# Patient Record
Sex: MALE | Race: WHITE | Employment: UNEMPLOYED | URBAN - METROPOLITAN AREA
[De-identification: names, ages, dates, MRNs, and addresses within clinical notes are randomized per-mention and may not be internally consistent; named-entity substitution may affect disease eponyms.]

---

## 2019-10-13 ENCOUNTER — HOSPITAL ENCOUNTER (INPATIENT)
Age: 64
LOS: 1 days | Discharge: NURSING, REHAB OR LONG-TERM CARE | DRG: 247 | End: 2019-10-14
Attending: EMERGENCY MEDICINE | Admitting: INTERNAL MEDICINE

## 2019-10-13 ENCOUNTER — APPOINTMENT (OUTPATIENT)
Dept: GENERAL RADIOLOGY | Age: 64
DRG: 247 | End: 2019-10-13

## 2019-10-13 DIAGNOSIS — I21.3 ST ELEVATION MYOCARDIAL INFARCTION (STEMI), UNSPECIFIED ARTERY (HCC): Primary | ICD-10-CM

## 2019-10-13 DIAGNOSIS — R11.2 NON-INTRACTABLE VOMITING WITH NAUSEA, UNSPECIFIED VOMITING TYPE: ICD-10-CM

## 2019-10-13 LAB
A/G RATIO: 1.4 (ref 1.1–2.2)
ALBUMIN SERPL-MCNC: 4.5 G/DL (ref 3.4–5)
ALP BLD-CCNC: 88 U/L (ref 40–129)
ALT SERPL-CCNC: 26 U/L (ref 10–40)
ANION GAP SERPL CALCULATED.3IONS-SCNC: 14 MMOL/L (ref 3–16)
APTT: 29.2 SEC (ref 26–36)
APTT: 29.7 SEC (ref 26–36)
APTT: 30.1 SEC (ref 26–36)
AST SERPL-CCNC: 29 U/L (ref 15–37)
BASOPHILS ABSOLUTE: 0.1 K/UL (ref 0–0.2)
BASOPHILS RELATIVE PERCENT: 0.5 %
BILIRUB SERPL-MCNC: <0.2 MG/DL (ref 0–1)
BUN BLDV-MCNC: 17 MG/DL (ref 7–20)
CALCIUM SERPL-MCNC: 9.5 MG/DL (ref 8.3–10.6)
CHLORIDE BLD-SCNC: 97 MMOL/L (ref 99–110)
CO2: 28 MMOL/L (ref 21–32)
CREAT SERPL-MCNC: 0.9 MG/DL (ref 0.8–1.3)
EKG ATRIAL RATE: 101 BPM
EKG ATRIAL RATE: 60 BPM
EKG DIAGNOSIS: NORMAL
EKG DIAGNOSIS: NORMAL
EKG P AXIS: 66 DEGREES
EKG P AXIS: 76 DEGREES
EKG P-R INTERVAL: 178 MS
EKG P-R INTERVAL: 190 MS
EKG Q-T INTERVAL: 342 MS
EKG Q-T INTERVAL: 408 MS
EKG QRS DURATION: 102 MS
EKG QRS DURATION: 74 MS
EKG QTC CALCULATION (BAZETT): 408 MS
EKG QTC CALCULATION (BAZETT): 443 MS
EKG R AXIS: -23 DEGREES
EKG R AXIS: 77 DEGREES
EKG T AXIS: 32 DEGREES
EKG T AXIS: 90 DEGREES
EKG VENTRICULAR RATE: 101 BPM
EKG VENTRICULAR RATE: 60 BPM
EOSINOPHILS ABSOLUTE: 0.1 K/UL (ref 0–0.6)
EOSINOPHILS RELATIVE PERCENT: 0.3 %
GFR AFRICAN AMERICAN: >60
GFR NON-AFRICAN AMERICAN: >60
GLOBULIN: 3.3 G/DL
GLUCOSE BLD-MCNC: 125 MG/DL (ref 70–99)
HCT VFR BLD CALC: 49.3 % (ref 40.5–52.5)
HEMOGLOBIN: 16.5 G/DL (ref 13.5–17.5)
LYMPHOCYTES ABSOLUTE: 1.9 K/UL (ref 1–5.1)
LYMPHOCYTES RELATIVE PERCENT: 10.5 %
MCH RBC QN AUTO: 31.3 PG (ref 26–34)
MCHC RBC AUTO-ENTMCNC: 33.5 G/DL (ref 31–36)
MCV RBC AUTO: 93.4 FL (ref 80–100)
MONOCYTES ABSOLUTE: 0.9 K/UL (ref 0–1.3)
MONOCYTES RELATIVE PERCENT: 5.1 %
NEUTROPHILS ABSOLUTE: 15.3 K/UL (ref 1.7–7.7)
NEUTROPHILS RELATIVE PERCENT: 83.6 %
PDW BLD-RTO: 13.6 % (ref 12.4–15.4)
PLATELET # BLD: 199 K/UL (ref 135–450)
PMV BLD AUTO: 8.4 FL (ref 5–10.5)
POTASSIUM SERPL-SCNC: 3.7 MMOL/L (ref 3.5–5.1)
RBC # BLD: 5.28 M/UL (ref 4.2–5.9)
SODIUM BLD-SCNC: 139 MMOL/L (ref 136–145)
TOTAL PROTEIN: 7.8 G/DL (ref 6.4–8.2)
TROPONIN: 0.03 NG/ML
WBC # BLD: 18.3 K/UL (ref 4–11)

## 2019-10-13 PROCEDURE — 2060000000 HC ICU INTERMEDIATE R&B

## 2019-10-13 PROCEDURE — C1769 GUIDE WIRE: HCPCS

## 2019-10-13 PROCEDURE — 71045 X-RAY EXAM CHEST 1 VIEW: CPT

## 2019-10-13 PROCEDURE — 96375 TX/PRO/DX INJ NEW DRUG ADDON: CPT

## 2019-10-13 PROCEDURE — 84484 ASSAY OF TROPONIN QUANT: CPT

## 2019-10-13 PROCEDURE — 85025 COMPLETE CBC W/AUTO DIFF WBC: CPT

## 2019-10-13 PROCEDURE — 99291 CRITICAL CARE FIRST HOUR: CPT | Performed by: INTERNAL MEDICINE

## 2019-10-13 PROCEDURE — 6360000004 HC RX CONTRAST MEDICATION

## 2019-10-13 PROCEDURE — 6360000002 HC RX W HCPCS

## 2019-10-13 PROCEDURE — 99285 EMERGENCY DEPT VISIT HI MDM: CPT

## 2019-10-13 PROCEDURE — 99152 MOD SED SAME PHYS/QHP 5/>YRS: CPT | Performed by: INTERNAL MEDICINE

## 2019-10-13 PROCEDURE — 93005 ELECTROCARDIOGRAM TRACING: CPT | Performed by: INTERNAL MEDICINE

## 2019-10-13 PROCEDURE — 2500000003 HC RX 250 WO HCPCS

## 2019-10-13 PROCEDURE — B2151ZZ FLUOROSCOPY OF LEFT HEART USING LOW OSMOLAR CONTRAST: ICD-10-PCS | Performed by: INTERNAL MEDICINE

## 2019-10-13 PROCEDURE — C1874 STENT, COATED/COV W/DEL SYS: HCPCS

## 2019-10-13 PROCEDURE — 2580000003 HC RX 258: Performed by: INTERNAL MEDICINE

## 2019-10-13 PROCEDURE — 4A023N7 MEASUREMENT OF CARDIAC SAMPLING AND PRESSURE, LEFT HEART, PERCUTANEOUS APPROACH: ICD-10-PCS | Performed by: INTERNAL MEDICINE

## 2019-10-13 PROCEDURE — 93458 L HRT ARTERY/VENTRICLE ANGIO: CPT | Performed by: INTERNAL MEDICINE

## 2019-10-13 PROCEDURE — 6370000000 HC RX 637 (ALT 250 FOR IP)

## 2019-10-13 PROCEDURE — 99153 MOD SED SAME PHYS/QHP EA: CPT

## 2019-10-13 PROCEDURE — C1894 INTRO/SHEATH, NON-LASER: HCPCS

## 2019-10-13 PROCEDURE — 93458 L HRT ARTERY/VENTRICLE ANGIO: CPT

## 2019-10-13 PROCEDURE — 96374 THER/PROPH/DIAG INJ IV PUSH: CPT

## 2019-10-13 PROCEDURE — 6370000000 HC RX 637 (ALT 250 FOR IP): Performed by: INTERNAL MEDICINE

## 2019-10-13 PROCEDURE — 2709999900 HC NON-CHARGEABLE SUPPLY

## 2019-10-13 PROCEDURE — 6360000002 HC RX W HCPCS: Performed by: EMERGENCY MEDICINE

## 2019-10-13 PROCEDURE — 99152 MOD SED SAME PHYS/QHP 5/>YRS: CPT

## 2019-10-13 PROCEDURE — 2720000010 HC SURG SUPPLY STERILE

## 2019-10-13 PROCEDURE — 92941 PRQ TRLML REVSC TOT OCCL AMI: CPT

## 2019-10-13 PROCEDURE — 85730 THROMBOPLASTIN TIME PARTIAL: CPT

## 2019-10-13 PROCEDURE — 92941 PRQ TRLML REVSC TOT OCCL AMI: CPT | Performed by: INTERNAL MEDICINE

## 2019-10-13 PROCEDURE — 36415 COLL VENOUS BLD VENIPUNCTURE: CPT

## 2019-10-13 PROCEDURE — 027035Z DILATION OF CORONARY ARTERY, ONE ARTERY WITH TWO DRUG-ELUTING INTRALUMINAL DEVICES, PERCUTANEOUS APPROACH: ICD-10-PCS | Performed by: INTERNAL MEDICINE

## 2019-10-13 PROCEDURE — 99232 SBSQ HOSP IP/OBS MODERATE 35: CPT | Performed by: INTERNAL MEDICINE

## 2019-10-13 PROCEDURE — 2580000003 HC RX 258: Performed by: EMERGENCY MEDICINE

## 2019-10-13 PROCEDURE — 80053 COMPREHEN METABOLIC PANEL: CPT

## 2019-10-13 PROCEDURE — C1725 CATH, TRANSLUMIN NON-LASER: HCPCS

## 2019-10-13 PROCEDURE — 93010 ELECTROCARDIOGRAM REPORT: CPT | Performed by: INTERNAL MEDICINE

## 2019-10-13 PROCEDURE — B2111ZZ FLUOROSCOPY OF MULTIPLE CORONARY ARTERIES USING LOW OSMOLAR CONTRAST: ICD-10-PCS | Performed by: INTERNAL MEDICINE

## 2019-10-13 PROCEDURE — 93005 ELECTROCARDIOGRAM TRACING: CPT | Performed by: EMERGENCY MEDICINE

## 2019-10-13 PROCEDURE — 85347 COAGULATION TIME ACTIVATED: CPT

## 2019-10-13 PROCEDURE — C1887 CATHETER, GUIDING: HCPCS

## 2019-10-13 PROCEDURE — 92973 PRQ TRLUML C MCHN ASP THRMBC: CPT

## 2019-10-13 RX ORDER — ONDANSETRON 2 MG/ML
4 INJECTION INTRAMUSCULAR; INTRAVENOUS ONCE
Status: COMPLETED | OUTPATIENT
Start: 2019-10-13 | End: 2019-10-13

## 2019-10-13 RX ORDER — 0.9 % SODIUM CHLORIDE 0.9 %
500 INTRAVENOUS SOLUTION INTRAVENOUS ONCE
Status: COMPLETED | OUTPATIENT
Start: 2019-10-13 | End: 2019-10-13

## 2019-10-13 RX ORDER — OXYCODONE HYDROCHLORIDE AND ACETAMINOPHEN 5; 325 MG/1; MG/1
1 TABLET ORAL EVERY 4 HOURS PRN
Status: DISCONTINUED | OUTPATIENT
Start: 2019-10-13 | End: 2019-10-14 | Stop reason: HOSPADM

## 2019-10-13 RX ORDER — SODIUM CHLORIDE 9 MG/ML
INJECTION, SOLUTION INTRAVENOUS CONTINUOUS
Status: DISCONTINUED | OUTPATIENT
Start: 2019-10-13 | End: 2019-10-14

## 2019-10-13 RX ORDER — ACETAMINOPHEN 325 MG/1
650 TABLET ORAL EVERY 4 HOURS PRN
Status: DISCONTINUED | OUTPATIENT
Start: 2019-10-13 | End: 2019-10-14 | Stop reason: HOSPADM

## 2019-10-13 RX ORDER — MIDAZOLAM HYDROCHLORIDE 1 MG/ML
INJECTION INTRAMUSCULAR; INTRAVENOUS
Status: COMPLETED | OUTPATIENT
Start: 2019-10-13 | End: 2019-10-13

## 2019-10-13 RX ORDER — HEPARIN SODIUM 1000 [USP'U]/ML
4000 INJECTION, SOLUTION INTRAVENOUS; SUBCUTANEOUS ONCE
Status: COMPLETED | OUTPATIENT
Start: 2019-10-13 | End: 2019-10-13

## 2019-10-13 RX ORDER — SODIUM CHLORIDE 0.9 % (FLUSH) 0.9 %
10 SYRINGE (ML) INJECTION EVERY 12 HOURS SCHEDULED
Status: DISCONTINUED | OUTPATIENT
Start: 2019-10-13 | End: 2019-10-14 | Stop reason: HOSPADM

## 2019-10-13 RX ORDER — ONDANSETRON 2 MG/ML
4 INJECTION INTRAMUSCULAR; INTRAVENOUS EVERY 6 HOURS PRN
Status: DISCONTINUED | OUTPATIENT
Start: 2019-10-13 | End: 2019-10-14 | Stop reason: HOSPADM

## 2019-10-13 RX ORDER — SODIUM CHLORIDE 9 MG/ML
INJECTION, SOLUTION INTRAVENOUS CONTINUOUS
Status: DISPENSED | OUTPATIENT
Start: 2019-10-13 | End: 2019-10-13

## 2019-10-13 RX ORDER — ONDANSETRON 2 MG/ML
INJECTION INTRAMUSCULAR; INTRAVENOUS
Status: COMPLETED | OUTPATIENT
Start: 2019-10-13 | End: 2019-10-13

## 2019-10-13 RX ORDER — MORPHINE SULFATE 2 MG/ML
2 INJECTION, SOLUTION INTRAMUSCULAR; INTRAVENOUS
Status: DISCONTINUED | OUTPATIENT
Start: 2019-10-13 | End: 2019-10-14 | Stop reason: HOSPADM

## 2019-10-13 RX ORDER — SODIUM CHLORIDE 0.9 % (FLUSH) 0.9 %
10 SYRINGE (ML) INJECTION PRN
Status: DISCONTINUED | OUTPATIENT
Start: 2019-10-13 | End: 2019-10-14 | Stop reason: HOSPADM

## 2019-10-13 RX ORDER — FENTANYL CITRATE 50 UG/ML
INJECTION, SOLUTION INTRAMUSCULAR; INTRAVENOUS
Status: COMPLETED | OUTPATIENT
Start: 2019-10-13 | End: 2019-10-13

## 2019-10-13 RX ORDER — OXYCODONE HYDROCHLORIDE AND ACETAMINOPHEN 5; 325 MG/1; MG/1
2 TABLET ORAL EVERY 4 HOURS PRN
Status: DISCONTINUED | OUTPATIENT
Start: 2019-10-13 | End: 2019-10-14 | Stop reason: HOSPADM

## 2019-10-13 RX ORDER — ASPIRIN 81 MG/1
81 TABLET ORAL DAILY
Status: DISCONTINUED | OUTPATIENT
Start: 2019-10-14 | End: 2019-10-14 | Stop reason: HOSPADM

## 2019-10-13 RX ORDER — SODIUM CHLORIDE 9 MG/ML
INJECTION, SOLUTION INTRAVENOUS
Status: DISPENSED
Start: 2019-10-13 | End: 2019-10-13

## 2019-10-13 RX ORDER — ATORVASTATIN CALCIUM 40 MG/1
40 TABLET, FILM COATED ORAL NIGHTLY
Status: DISCONTINUED | OUTPATIENT
Start: 2019-10-13 | End: 2019-10-14 | Stop reason: HOSPADM

## 2019-10-13 RX ORDER — LISINOPRIL 2.5 MG/1
2.5 TABLET ORAL DAILY
Status: DISCONTINUED | OUTPATIENT
Start: 2019-10-13 | End: 2019-10-14 | Stop reason: HOSPADM

## 2019-10-13 RX ORDER — MORPHINE SULFATE 4 MG/ML
4 INJECTION, SOLUTION INTRAMUSCULAR; INTRAVENOUS ONCE
Status: COMPLETED | OUTPATIENT
Start: 2019-10-13 | End: 2019-10-13

## 2019-10-13 RX ORDER — ATROPINE SULFATE 0.1 MG/ML
INJECTION INTRAVENOUS
Status: COMPLETED | OUTPATIENT
Start: 2019-10-13 | End: 2019-10-13

## 2019-10-13 RX ADMIN — TICAGRELOR 90 MG: 90 TABLET ORAL at 03:00

## 2019-10-13 RX ADMIN — ATORVASTATIN CALCIUM 40 MG: 40 TABLET, FILM COATED ORAL at 20:22

## 2019-10-13 RX ADMIN — SODIUM CHLORIDE: 9 INJECTION, SOLUTION INTRAVENOUS at 02:58

## 2019-10-13 RX ADMIN — HEPARIN SODIUM 4000 UNITS: 1000 INJECTION, SOLUTION INTRAVENOUS; SUBCUTANEOUS at 00:23

## 2019-10-13 RX ADMIN — SODIUM CHLORIDE 500 ML: 9 INJECTION, SOLUTION INTRAVENOUS at 00:20

## 2019-10-13 RX ADMIN — METOPROLOL TARTRATE 25 MG: 25 TABLET ORAL at 03:00

## 2019-10-13 RX ADMIN — FENTANYL CITRATE 25 MCG: 50 INJECTION, SOLUTION INTRAMUSCULAR; INTRAVENOUS at 01:46

## 2019-10-13 RX ADMIN — TICAGRELOR 90 MG: 90 TABLET ORAL at 20:22

## 2019-10-13 RX ADMIN — ONDANSETRON 4 MG: 2 INJECTION INTRAMUSCULAR; INTRAVENOUS at 01:16

## 2019-10-13 RX ADMIN — Medication 10 ML: at 09:43

## 2019-10-13 RX ADMIN — METOPROLOL TARTRATE 25 MG: 25 TABLET ORAL at 20:22

## 2019-10-13 RX ADMIN — MIDAZOLAM HYDROCHLORIDE 1 MG: 1 INJECTION INTRAMUSCULAR; INTRAVENOUS at 01:01

## 2019-10-13 RX ADMIN — SODIUM CHLORIDE: 9 INJECTION, SOLUTION INTRAVENOUS at 12:26

## 2019-10-13 RX ADMIN — ONDANSETRON 4 MG: 2 INJECTION INTRAMUSCULAR; INTRAVENOUS at 00:22

## 2019-10-13 RX ADMIN — METOPROLOL TARTRATE 25 MG: 25 TABLET ORAL at 09:42

## 2019-10-13 RX ADMIN — LISINOPRIL 2.5 MG: 2.5 TABLET ORAL at 12:26

## 2019-10-13 RX ADMIN — SODIUM CHLORIDE: 9 INJECTION, SOLUTION INTRAVENOUS at 17:31

## 2019-10-13 RX ADMIN — ATROPINE SULFATE 0.5 MG: 0.1 INJECTION INTRAVENOUS at 01:15

## 2019-10-13 RX ADMIN — FENTANYL CITRATE 25 MCG: 50 INJECTION, SOLUTION INTRAMUSCULAR; INTRAVENOUS at 00:59

## 2019-10-13 RX ADMIN — MIDAZOLAM HYDROCHLORIDE 1 MG: 1 INJECTION INTRAMUSCULAR; INTRAVENOUS at 01:00

## 2019-10-13 RX ADMIN — MORPHINE SULFATE 4 MG: 4 INJECTION INTRAVENOUS at 00:21

## 2019-10-13 RX ADMIN — TICAGRELOR 90 MG: 90 TABLET ORAL at 09:42

## 2019-10-13 RX ADMIN — MIDAZOLAM HYDROCHLORIDE 1 MG: 1 INJECTION INTRAMUSCULAR; INTRAVENOUS at 01:46

## 2019-10-13 ASSESSMENT — ENCOUNTER SYMPTOMS
VOMITING: 1
ABDOMINAL PAIN: 0
SHORTNESS OF BREATH: 1
COLOR CHANGE: 0
NAUSEA: 1
BACK PAIN: 0

## 2019-10-13 ASSESSMENT — PAIN SCALES - GENERAL
PAINLEVEL_OUTOF10: 0
PAINLEVEL_OUTOF10: 6

## 2019-10-13 ASSESSMENT — PAIN DESCRIPTION - PAIN TYPE: TYPE: ACUTE PAIN

## 2019-10-13 ASSESSMENT — PAIN DESCRIPTION - LOCATION: LOCATION: CHEST

## 2019-10-14 VITALS
DIASTOLIC BLOOD PRESSURE: 77 MMHG | BODY MASS INDEX: 23.81 KG/M2 | SYSTOLIC BLOOD PRESSURE: 128 MMHG | TEMPERATURE: 98 F | OXYGEN SATURATION: 97 % | HEIGHT: 67 IN | HEART RATE: 61 BPM | WEIGHT: 151.68 LBS | RESPIRATION RATE: 16 BRPM

## 2019-10-14 PROBLEM — I21.3 STEMI (ST ELEVATION MYOCARDIAL INFARCTION) (HCC): Status: ACTIVE | Noted: 2019-10-14

## 2019-10-14 LAB
ANION GAP SERPL CALCULATED.3IONS-SCNC: 9 MMOL/L (ref 3–16)
BUN BLDV-MCNC: 12 MG/DL (ref 7–20)
CALCIUM SERPL-MCNC: 8.6 MG/DL (ref 8.3–10.6)
CHLORIDE BLD-SCNC: 102 MMOL/L (ref 99–110)
CHOLESTEROL, TOTAL: 116 MG/DL (ref 0–199)
CO2: 23 MMOL/L (ref 21–32)
CREAT SERPL-MCNC: 0.7 MG/DL (ref 0.8–1.3)
GFR AFRICAN AMERICAN: >60
GFR NON-AFRICAN AMERICAN: >60
GLUCOSE BLD-MCNC: 108 MG/DL (ref 70–99)
HDLC SERPL-MCNC: 38 MG/DL (ref 40–60)
LDL CHOLESTEROL CALCULATED: 55 MG/DL
LV EF: 40 %
LVEF MODALITY: NORMAL
POTASSIUM REFLEX MAGNESIUM: 4.1 MMOL/L (ref 3.5–5.1)
SODIUM BLD-SCNC: 134 MMOL/L (ref 136–145)
TRIGL SERPL-MCNC: 117 MG/DL (ref 0–150)
VLDLC SERPL CALC-MCNC: 23 MG/DL

## 2019-10-14 PROCEDURE — 36415 COLL VENOUS BLD VENIPUNCTURE: CPT

## 2019-10-14 PROCEDURE — 6370000000 HC RX 637 (ALT 250 FOR IP): Performed by: INTERNAL MEDICINE

## 2019-10-14 PROCEDURE — 2580000003 HC RX 258: Performed by: INTERNAL MEDICINE

## 2019-10-14 PROCEDURE — 99233 SBSQ HOSP IP/OBS HIGH 50: CPT | Performed by: NURSE PRACTITIONER

## 2019-10-14 PROCEDURE — 93306 TTE W/DOPPLER COMPLETE: CPT

## 2019-10-14 PROCEDURE — 80061 LIPID PANEL: CPT

## 2019-10-14 PROCEDURE — 80048 BASIC METABOLIC PNL TOTAL CA: CPT

## 2019-10-14 RX ORDER — ATORVASTATIN CALCIUM 40 MG/1
40 TABLET, FILM COATED ORAL NIGHTLY
Qty: 30 TABLET | Refills: 3 | Status: SHIPPED | OUTPATIENT
Start: 2019-10-14 | End: 2019-11-11 | Stop reason: SDUPTHER

## 2019-10-14 RX ORDER — NITROGLYCERIN 0.4 MG/1
0.4 TABLET SUBLINGUAL EVERY 5 MIN PRN
Qty: 25 TABLET | Refills: 3 | Status: SHIPPED | OUTPATIENT
Start: 2019-10-14

## 2019-10-14 RX ORDER — LISINOPRIL 2.5 MG/1
2.5 TABLET ORAL DAILY
Qty: 30 TABLET | Refills: 3 | Status: SHIPPED | OUTPATIENT
Start: 2019-10-15 | End: 2019-11-11 | Stop reason: SDUPTHER

## 2019-10-14 RX ORDER — ASPIRIN 81 MG/1
81 TABLET ORAL DAILY
Qty: 30 TABLET | Refills: 11 | Status: SHIPPED | OUTPATIENT
Start: 2019-10-15

## 2019-10-14 RX ORDER — METOPROLOL SUCCINATE 25 MG/1
25 TABLET, EXTENDED RELEASE ORAL DAILY
Qty: 90 TABLET | Refills: 1 | Status: SHIPPED | OUTPATIENT
Start: 2019-10-14 | End: 2019-10-28 | Stop reason: SDUPTHER

## 2019-10-14 RX ADMIN — Medication 10 ML: at 09:46

## 2019-10-14 RX ADMIN — ACETAMINOPHEN 650 MG: 325 TABLET ORAL at 02:41

## 2019-10-14 RX ADMIN — LISINOPRIL 2.5 MG: 2.5 TABLET ORAL at 09:46

## 2019-10-14 RX ADMIN — ASPIRIN 81 MG: 81 TABLET ORAL at 09:46

## 2019-10-14 RX ADMIN — TICAGRELOR 90 MG: 90 TABLET ORAL at 09:46

## 2019-10-14 RX ADMIN — METOPROLOL TARTRATE 25 MG: 25 TABLET ORAL at 09:46

## 2019-10-14 ASSESSMENT — PAIN SCALES - GENERAL: PAINLEVEL_OUTOF10: 0

## 2019-10-15 LAB
POC ACT LR: 259 SEC
POC ACT LR: 345 SEC

## 2019-10-28 ENCOUNTER — OFFICE VISIT (OUTPATIENT)
Dept: CARDIOLOGY CLINIC | Age: 64
End: 2019-10-28

## 2019-10-28 VITALS
BODY MASS INDEX: 23.35 KG/M2 | HEIGHT: 67 IN | DIASTOLIC BLOOD PRESSURE: 60 MMHG | SYSTOLIC BLOOD PRESSURE: 84 MMHG | WEIGHT: 148.8 LBS | OXYGEN SATURATION: 98 % | HEART RATE: 76 BPM

## 2019-10-28 DIAGNOSIS — I25.5 CARDIOMYOPATHY, ISCHEMIC: Primary | ICD-10-CM

## 2019-10-28 DIAGNOSIS — R07.2 PRECORDIAL PAIN: ICD-10-CM

## 2019-10-28 DIAGNOSIS — R06.02 SOB (SHORTNESS OF BREATH): ICD-10-CM

## 2019-10-28 PROCEDURE — 99213 OFFICE O/P EST LOW 20 MIN: CPT | Performed by: INTERNAL MEDICINE

## 2019-10-28 RX ORDER — METOPROLOL SUCCINATE 25 MG/1
12.5 TABLET, EXTENDED RELEASE ORAL DAILY
Qty: 90 TABLET | Refills: 5
Start: 2019-10-28 | End: 2019-11-11 | Stop reason: SDUPTHER

## 2019-11-11 ENCOUNTER — TELEPHONE (OUTPATIENT)
Dept: CARDIOLOGY CLINIC | Age: 64
End: 2019-11-11

## 2019-11-11 RX ORDER — METOPROLOL SUCCINATE 25 MG/1
12.5 TABLET, EXTENDED RELEASE ORAL DAILY
Qty: 90 TABLET | Refills: 3 | Status: SHIPPED | OUTPATIENT
Start: 2019-11-11 | End: 2020-12-04

## 2019-11-11 RX ORDER — ATORVASTATIN CALCIUM 40 MG/1
40 TABLET, FILM COATED ORAL NIGHTLY
Qty: 90 TABLET | Refills: 3 | Status: SHIPPED | OUTPATIENT
Start: 2019-11-11 | End: 2020-08-06 | Stop reason: SDUPTHER

## 2019-11-11 RX ORDER — LISINOPRIL 2.5 MG/1
2.5 TABLET ORAL DAILY
Qty: 90 TABLET | Refills: 3 | Status: SHIPPED | OUTPATIENT
Start: 2019-11-11 | End: 2020-08-06 | Stop reason: SDUPTHER

## 2019-12-09 ENCOUNTER — TELEPHONE (OUTPATIENT)
Dept: CARDIOLOGY CLINIC | Age: 64
End: 2019-12-09

## 2020-01-13 ENCOUNTER — TELEPHONE (OUTPATIENT)
Dept: CARDIOLOGY CLINIC | Age: 65
End: 2020-01-13

## 2020-02-28 ENCOUNTER — TELEPHONE (OUTPATIENT)
Dept: CARDIOLOGY CLINIC | Age: 65
End: 2020-02-28

## 2020-02-28 NOTE — TELEPHONE ENCOUNTER
Sample requested: Brilinta    Strength: 90mg TABS    Dosage:  Take 1 tablet by mouth 2 times daily    Patient's call back number: [phone]

## 2020-04-01 ENCOUNTER — TELEPHONE (OUTPATIENT)
Dept: CARDIOLOGY CLINIC | Age: 65
End: 2020-04-01

## 2020-04-30 NOTE — PROGRESS NOTES
EXAM:    Vitals:    05/01/20 1300   BP: 102/62   Pulse: 80   Weight: 151 lb (68.5 kg)   Height: 5' 7\" (1.702 m)     Body mass index is 23.65 kg/m². Wt Readings from Last 3 Encounters:   05/01/20 151 lb (68.5 kg)   10/28/19 148 lb 12.8 oz (67.5 kg)   10/13/19 151 lb 10.8 oz (68.8 kg)     BP Readings from Last 3 Encounters:   05/01/20 102/62   10/28/19 84/60   10/14/19 128/77        PHYSICAL EXAMINATION:    Vital Signs: (As obtained by patient/caregiver or practitioner observation)    Blood pressure-  Heart rate-    Respiratory rate-    Temperature-  Pulse oximetry-     Constitutional: [x] Appears well-developed and well-nourished [x] No apparent distress      [] Abnormal-   Mental status  [x] Alert and awake  [x] Oriented to person/place/time []Able to follow commands        HENT:   [x] Normocephalic, atraumatic.   [] Abnormal   [x] Mouth/Throat: Mucous membranes are moist.       Neck: [x] No visualized mass     Pulmonary/Chest: [x] Respiratory effort normal.  [x] No visualized signs of difficulty breathing or respiratory distress        [] Abnormal-       Neurological:        [x] No Facial Asymmetry (Cranial nerve 7 motor function) (limited exam to video visit)          [x] No gaze palsy        [] Abnormal-               Psychiatric:       [x] Normal Affect [x] No Hallucinations        [] Abnormal-     Other pertinent observable physical exam findings-                    Labs   CBC:   Lab Results   Component Value Date    WBC 18.3 10/13/2019    RBC 5.28 10/13/2019    HGB 16.5 10/13/2019    HCT 49.3 10/13/2019    MCV 93.4 10/13/2019    RDW 13.6 10/13/2019     10/13/2019     CMP:  Lab Results   Component Value Date     10/14/2019    K 4.1 10/14/2019     10/14/2019    CO2 23 10/14/2019    BUN 12 10/14/2019    CREATININE 0.7 10/14/2019    GFRAA >60 10/14/2019    GFRAA >60 05/14/2013    AGRATIO 1.4 10/13/2019    LABGLOM >60 10/14/2019    GLUCOSE 108 10/14/2019    PROT 7.8 10/13/2019    CALCIUM 8.6 10/14/2019    BILITOT <0.2 10/13/2019    ALKPHOS 88 10/13/2019    AST 29 10/13/2019    ALT 26 10/13/2019     PT/INR:  No results found for: PTINR  HgBA1c:No results found for: LABA1C  Lab Results   Component Value Date    TROPONINI 0.03 (H) 10/13/2019         Cardiac Data     Last EKG: 10/2019, nsr, infer infarct    Echo: 10/13/19   Left ventricular systolic function is mildly reduced with a visually   estimated ejection fraction of 40 %. The left ventricle is normal in size with normal wall thickness. Hypokinesis of the mid and basal inferoseptal and anteroseptal wall   segments. Grade I diastolic dysfunction with normal LV pressure. Mild tricuspid regurgitation. Systolic pulmonary artery pressure (SPAP) is normal and estimated at 18 mmHg   (right atrial pressure 3 mmHg). Stress Test:    Cath: 10/13/19  Cardiac Cath with PCI, LVG:  Anatomy:   LM-Ostial 40%, mid Anuerysmal   LAD-Patent  Cx-Patent  OM- Patent  RCA-Dominant, Proximal 100% thrombotic occluded  RPDA- No disease  LVEF- 60%  LVG- Inferobasal akinesis  LVEDP- 20     Intervention  ~Successful PCI to proximal RCA with 3.5x18 GIUSEPPE post dilated with 3.75x12 NC balloon to 16atm and distal RCA with 3.5x28 GIUSEPPE to 12atm. MULUGETA 0 to MULUGETA 3, 100% down to 0%. Excellent Result. Contrast: 190ml  Flouro Time: 17.3m  Access: R radial     Impression  ~Coronary Angiography w/ STEMI of RCA, 100% thrombotic occlusion. Moderate ostial LM stenosis  ~LVG with LVEF of 60% and inferobasal regional wall motion abnormalities  ~Successful complex angioplasty and stenting of RCA           Recommendation  ~Aggressive medical treatment and risk factor modification  ~1. Cont aggrastat for 6 hours. Post cath IVF. Bedrest.  2. Recommend beta blocker, high potency statin, aspirin and Brilinta for 12 months  3. Referral to cardiac rehab placed  4. Patient has been advised on the importance of regular exercise of at least 20-30 minutes daily.    5. Patient counseled about and offered assistance for smoking cessation   4. Follow up in 1-2 weeks with cardiology            Studies:       I have reviewed labs and imaging/xray/diagnostic testing in this note. Assessment        Patient Active Problem List   Diagnosis    Chest pain    Chronic back pain    Current smoker    ST elevation myocardial infarction involving right coronary artery ([facility]    STEMI (ST elevation myocardial infarction) ([facility]    Cardiomyopathy, ischemic    SOB (shortness of breath)                Plan   1. Echo to evaluate cardiomyopathy. The  will call you to schedule  2. Stress myoview to evaluate chest pain, abnl ekg, h/o cad  3. Liver and lipids   4. Stop brilinta  5. Start plavix 300 mg on day one and then 75 mg daily afterwards  6. Follow up with NP in 2 months. Virtual visit ok      Scribe's attestation: This note was scribed in the presence of Dr. Tony Green by Rakesh Delgado RN        Jeanette James is a 63 y.o. male being evaluated by a Virtual Visit (video visit) encounter to address concerns as mentioned above. A caregiver was present when appropriate. Due to this being a TeleHealth encounter (During [address] emergency), evaluation of the following organ systems was limited: Vitals/Constitutional/EENT/Resp/CV/GI//MS/Neuro/Skin/Heme-Lymph-Imm. Pursuant to the emergency declaration under the [address] [place] [address], [address] [place] [address] authority and the [employer] and [employer] Act, this Virtual Visit was conducted with patient's (and/or legal guardian's) consent, to reduce the patient's risk of exposure to COVID-19 and provide necessary medical care. The patient (and/or legal guardian) has also been advised to contact this office for worsening conditions or problems, and seek emergency medical treatment and/or call 911 if deemed necessary.      Services were provided through a video synchronous discussion virtually to

## 2020-05-01 ENCOUNTER — TELEPHONE (OUTPATIENT)
Dept: CARDIOLOGY CLINIC | Age: 65
End: 2020-05-01

## 2020-05-01 ENCOUNTER — VIRTUAL VISIT (OUTPATIENT)
Dept: CARDIOLOGY CLINIC | Age: 65
End: 2020-05-01

## 2020-05-01 VITALS
SYSTOLIC BLOOD PRESSURE: 102 MMHG | WEIGHT: 151 LBS | HEIGHT: 67 IN | HEART RATE: 80 BPM | BODY MASS INDEX: 23.7 KG/M2 | DIASTOLIC BLOOD PRESSURE: 62 MMHG

## 2020-05-01 PROCEDURE — 99214 OFFICE O/P EST MOD 30 MIN: CPT | Performed by: INTERNAL MEDICINE

## 2020-05-01 RX ORDER — CLOPIDOGREL BISULFATE 75 MG/1
75 TABLET ORAL DAILY
Qty: 34 TABLET | Refills: 5 | Status: SHIPPED | OUTPATIENT
Start: 2020-05-01 | End: 2020-08-06 | Stop reason: SDUPTHER

## 2020-05-01 RX ORDER — CLOPIDOGREL BISULFATE 75 MG/1
TABLET ORAL
Qty: 34 TABLET | Refills: 5 | Status: CANCELLED | OUTPATIENT
Start: 2020-05-01

## 2020-05-20 ENCOUNTER — TELEPHONE (OUTPATIENT)
Dept: CARDIOLOGY CLINIC | Age: 65
End: 2020-05-20

## 2020-07-01 PROBLEM — I25.10 CORONARY ARTERY DISEASE INVOLVING NATIVE CORONARY ARTERY OF NATIVE HEART WITHOUT ANGINA PECTORIS: Status: ACTIVE | Noted: 2020-07-01

## 2020-08-06 ENCOUNTER — TELEPHONE (OUTPATIENT)
Dept: CARDIOLOGY CLINIC | Age: 65
End: 2020-08-06

## 2020-08-06 RX ORDER — ATORVASTATIN CALCIUM 40 MG/1
40 TABLET, FILM COATED ORAL NIGHTLY
Qty: 90 TABLET | Refills: 3 | Status: SHIPPED | OUTPATIENT
Start: 2020-08-06 | End: 2021-08-13

## 2020-08-06 RX ORDER — CLOPIDOGREL BISULFATE 75 MG/1
75 TABLET ORAL DAILY
Qty: 90 TABLET | Refills: 3 | Status: SHIPPED | OUTPATIENT
Start: 2020-08-06 | End: 2020-08-06

## 2020-08-06 RX ORDER — LISINOPRIL 2.5 MG/1
2.5 TABLET ORAL DAILY
Qty: 90 TABLET | Refills: 3 | Status: SHIPPED | OUTPATIENT
Start: 2020-08-06 | End: 2021-07-29

## 2020-08-06 RX ORDER — CLOPIDOGREL BISULFATE 75 MG/1
75 TABLET ORAL DAILY
COMMUNITY
End: 2021-07-29

## 2020-08-06 NOTE — TELEPHONE ENCOUNTER
LOV 05/01/2020 w/ PRATEEK (vv)    Recommendation  ~Aggressive medical treatment and risk factor modification  ~1. Cont aggrastat for 6 hours. Post cath IVF. Bedrest.  2. Recommend beta blocker, high potency statin, aspirin and Brilinta for 12 months  3. Referral to cardiac rehab placed  4. Patient has been advised on the importance of regular exercise of at least 20-30 minutes daily. 5. Patient counseled about and offered assistance for smoking cessation   4.  Follow up in 1-2 weeks with cardiology

## 2020-08-06 NOTE — TELEPHONE ENCOUNTER
Pt has refilled his last aval supple on lisinopril (PRINIVIL;ZESTRIL) 2.5 MG tablet  clopidogrel (PLAVIX) 75 MG tablet   atorvastatin (LIPITOR) 40 MG tablet    Lov was June. Pt giving heads up that after this 90 day supply pharmacy will need new orders sent in.

## 2020-12-04 RX ORDER — METOPROLOL SUCCINATE 25 MG/1
TABLET, EXTENDED RELEASE ORAL
Qty: 45 TABLET | Refills: 1 | Status: SHIPPED | OUTPATIENT
Start: 2020-12-04 | End: 2021-05-26

## 2020-12-16 NOTE — PROGRESS NOTES
[facility]   CARDIAC EVALUATION NOTE  [phone]      PCP:  No primary care provider on file. Reason for Consultation/Chief Complaint: follow up for CAD     Subjective   History of Present Illness:  Linette Tavares is a 63 y.o. patient with a history of CAD with STEMI who presents for hospital follow up. His echo from 10/13/19 showed his EF was 40% with hypokinesis of the mid and basal inferoseptal and anteroseptal wall segments. Grade I DD. His cardiac cath from 10/13/19 resulted in a successful PCI to proximal RCA with GIUSEPPE and distal RCA with GIUSEPPE. He reports he has stopped smoking. He denies recurrent CP, palpitations, dizziness or syncope. He reports he has SOB with brilinta and late at night. He reports he lost his insurance after changing jobs. At his OV on 05/01/20, his brilinta was stopped and plavix was started. Linette Tavares is a 63 y.o. male evaluated via virtual visit (video) on 12/17/2020. Consent:  He and/or health care decision maker is aware that that he may receive a bill for this telephone service, depending on his insurance coverage, and has provided verbal consent to proceed: Yes      Documentation:  I communicated with the patient and/or health care decision maker about medications and symptoms. Details of this discussion including any medical advice provided: see below      I affirm this is a Patient Initiated Episode with an Established Patient who has not had a related appointment within my department in the past 7 days or scheduled within the next 24 hours.     Total Time: minutes: 5-10 minutes    Note: not billable if this call serves to triage the patient into an appointment for the relevant concern    12/17/2020    TELEHEALTH EVALUATION -- Audio/Visual (During [facility]-01 public health emergency) Lucio Sawant Monday (:  1955) has requested an audio/video evaluation for the following concern(s): CM and CAD. Today he reports he was feeling well until 1 month ago. He reports he stared having dizziness which has caused him to fall. He reports he has balance issues frequently. He reports he had CP and palpitations with the episodes. He reports his HR was slowing down. He denies SOB, BLE edema or syncope. He has 25% nerve damage from a previous back surgery. Past Medical History:   has a past medical history of Back pain. Surgical History:   has a past surgical history that includes back surgery. Social History:   reports that he quit smoking about a year ago. His smoking use included cigarettes. He smoked 2.00 packs per day. He has never used smokeless tobacco. He reports that he does not drink alcohol or use drugs. Family History:  family history includes COPD in his mother; Cancer in his father. Home Medications:  Were reviewed and are listed in nursing record and/or below  Prior to Admission medications    Medication Sig Start Date End Date Taking? Authorizing Provider   metoprolol succinate (TOPROL XL) 25 MG extended release tablet Take 1/2 (one-half) tablet by mouth once daily 20  Yes Junior Demetrius MD   atorvastatin (LIPITOR) 40 MG tablet Take 1 tablet by mouth nightly 20  Yes Junior Demetrius MD   lisinopril (PRINIVIL;ZESTRIL) 2.5 MG tablet Take 1 tablet by mouth daily 20  Yes Junior Demetrius MD   clopidogrel (PLAVIX) 75 MG tablet Take 75 mg by mouth daily   Yes Historical Provider, MD   aspirin 81 MG EC tablet Take 1 tablet by mouth daily 10/15/19  Yes STEFANO Julien CNP   nitroGLYCERIN (NITROSTAT) 0.4 MG SL tablet Place 1 tablet under the tongue every 5 minutes as needed for Chest pain up to max of 3 total doses.  If no relief after 1 dose, call 911. 10/14/19  Yes STEFANO Julien CNP acetaminophen (TYLENOL) 325 MG tablet Take 650 mg by mouth every 6 hours as needed. Yes Historical Provider, MD          Allergies:  Demerol hcl [meperidine], Codeine, and Percocet [oxycodone-acetaminophen]     Review of Systems:   A 14 point review of symptoms completed. Pertinent positives identified in the HPI, all other review of symptoms negative as below. Objective   PHYSICAL EXAM:    There were no vitals filed for this visit. There is no height or weight on file to calculate BMI. Wt Readings from Last 3 Encounters:   05/01/20 151 lb (68.5 kg)   10/28/19 148 lb 12.8 oz (67.5 kg)   10/13/19 151 lb 10.8 oz (68.8 kg)     BP Readings from Last 3 Encounters:   05/01/20 102/62   10/28/19 84/60   10/14/19 128/77      /71, pulse 86, weight 155. Labs   CBC:   Lab Results   Component Value Date    WBC 18.3 10/13/2019    RBC 5.28 10/13/2019    HGB 16.5 10/13/2019    HCT 49.3 10/13/2019    MCV 93.4 10/13/2019    RDW 13.6 10/13/2019     10/13/2019     CMP:  Lab Results   Component Value Date     10/14/2019    K 4.1 10/14/2019     10/14/2019    CO2 23 10/14/2019    BUN 12 10/14/2019    CREATININE 0.7 10/14/2019    GFRAA >60 10/14/2019    GFRAA >60 05/14/2013    AGRATIO 1.4 10/13/2019    LABGLOM >60 10/14/2019    GLUCOSE 108 10/14/2019    PROT 7.8 10/13/2019    CALCIUM 8.6 10/14/2019    BILITOT <0.2 10/13/2019    ALKPHOS 88 10/13/2019    AST 29 10/13/2019    ALT 26 10/13/2019     PT/INR:  No results found for: PTINR  HgBA1c:No results found for: LABA1C  Lab Results   Component Value Date    TROPONINI 0.03 (H) 10/13/2019         Cardiac Data     Last EKG: 10/2019, nsr, infer infarct    Echo: 10/13/19   Left ventricular systolic function is mildly reduced with a visually   estimated ejection fraction of 40 %. The left ventricle is normal in size with normal wall thickness. Hypokinesis of the mid and basal inferoseptal and anteroseptal wall   segments. Grade I diastolic dysfunction with normal LV pressure. Mild tricuspid regurgitation. Systolic pulmonary artery pressure (SPAP) is normal and estimated at 18 mmHg   (right atrial pressure 3 mmHg). Stress Test:    Cath: 10/13/19  Cardiac Cath with PCI, LVG:  Anatomy:   LM-Ostial 40%, mid Anuerysmal   LAD-Patent  Cx-Patent  OM- Patent  RCA-Dominant, Proximal 100% thrombotic occluded  RPDA- No disease  LVEF- 60%  LVG- Inferobasal akinesis  LVEDP- 20     Intervention  ~Successful PCI to proximal RCA with 3.5x18 GIUSEPPE post dilated with 3.75x12 NC balloon to 16atm and distal RCA with 3.5x28 GIUSEPPE to 12atm. MULUGETA 0 to MULUGETA 3, 100% down to 0%. Excellent Result. Contrast: 190ml  Flouro Time: 17.3m  Access: R radial     Impression  ~Coronary Angiography w/ STEMI of RCA, 100% thrombotic occlusion. Moderate ostial LM stenosis  ~LVG with LVEF of 60% and inferobasal regional wall motion abnormalities  ~Successful complex angioplasty and stenting of RCA           Recommendation  ~Aggressive medical treatment and risk factor modification  ~1. Cont aggrastat for 6 hours. Post cath IVF. Bedrest.  2. Recommend beta blocker, high potency statin, aspirin and Brilinta for 12 months  3. Referral to cardiac rehab placed  4. Patient has been advised on the importance of regular exercise of at least 20-30 minutes daily. 5. Patient counseled about and offered assistance for smoking cessation   4. Follow up in 1-2 weeks with cardiology            Studies:       I have reviewed labs and imaging/xray/diagnostic testing in this note. Assessment      1. Coronary artery disease involving native coronary artery of native heart without angina pectoris    2. ST elevation myocardial infarction involving right coronary artery (HCC)    3. Cardiomyopathy, ischemic    4. Precordial pain    5. Palpitations                Plan   1.  Mohsen Merl for chest pain   2. Echo for cardiomyopathy  3. 2 week cardiac monitor for palpitations 4. Liver and lipids  5. Follow up in 2 months with NP. Can be virtual      Scribe's attestation: This note was scribed in the presence of Dr. Bishop Mccoy by Cris Candelaria RN         Mi Palacios is a 63 y.o. male being evaluated by a Virtual Visit (video visit) encounter to address concerns as mentioned above. A caregiver was present when appropriate. Due to this being a TeleHealth encounter (During [facility] public health emergency), evaluation of the following organ systems was limited: Vitals/Constitutional/EENT/Resp/CV/GI//MS/Neuro/Skin/Heme-Lymph-Imm. Pursuant to the emergency declaration under the [address] [place] [address], [address] [place] [address] authority and the [employer] and [employer] Act, this Virtual Visit was conducted with patient's (and/or legal guardian's) consent, to reduce the patient's risk of exposure to COVID-19 and provide necessary medical care. The patient (and/or legal guardian) has also been advised to contact this office for worsening conditions or problems, and seek emergency medical treatment and/or call 911 if deemed necessary. Services were provided through a telephone synchronous discussion virtually to substitute for in-person clinic visit. Patient and provider were located at their individual homes. --Bishop Nadine MD on 12/17/2020 at 11:24 AM    An electronic signature was used to authenticate this note. Thank you for allowing us to participate in the care of Lucio Austin. Please call me with any questions [phone]. Bishop Nadine MD, [facility]   Interventional Cardiologist  [facility] [place]  [phone] [facility]  [phone] [address] [place] [address]  12/17/2020 11:24 AM    I will address the patient's cardiac risk factors and adjusted pharmacologic treatment as needed. In addition, I have reinforced the need for patient directed risk factor modification. Tobacco use was discussed with the patient and educated on the negative effects and was asked not to use. All questions and concerns were addressed to the patient/family. Alternatives to my treatment were discussed. I, Dr Zahida Good, personally performed the services described in this documentation, as scribed by the above signed scribe in my presence. It is both accurate and complete to my knowledge. I agree with the details independently gathered by the clinical support staff and the scribed note accurately describes my personal service to the patient.

## 2020-12-17 ENCOUNTER — VIRTUAL VISIT (OUTPATIENT)
Dept: CARDIOLOGY CLINIC | Age: 65
End: 2020-12-17

## 2020-12-17 ENCOUNTER — TELEPHONE (OUTPATIENT)
Dept: CARDIOLOGY CLINIC | Age: 65
End: 2020-12-17

## 2020-12-17 PROCEDURE — 99212 OFFICE O/P EST SF 10 MIN: CPT | Performed by: INTERNAL MEDICINE

## 2020-12-17 NOTE — TELEPHONE ENCOUNTER
Attempted to call pt. No answer no VM. He will need to call [address] [place] [address] to schedule stress and echo tests. He will need a follow up visit with NP in 2 months.  [phone] [address]ia for virtual.

## 2020-12-17 NOTE — TELEPHONE ENCOUNTER
Monitor placed by ordered by [employer] of monitor 14 days  Monitor ordered by DR Jose Manriquez  Serial number mailed  Kit ID   Activation successful prior to pt leaving office? NA mailed    Attempted to call pt to give him number for [employer] to make arrangements for self pay.  [employer] number   [phone]

## 2020-12-17 NOTE — PATIENT INSTRUCTIONS
1. Jill Chino for chest pain   2. Echo for low heart function  3. 2 week cardiac monitor for palpitations  4. Liver and lipids  5. Follow up in 2 months with NP. Can be virtual      Your provider has ordered testing for further evaluation. An order/prescription has been included in your paper work. ? To schedule outpatient testing, contact Central Scheduling by calling [employer] ([phone]).

## 2021-05-26 RX ORDER — METOPROLOL SUCCINATE 25 MG/1
TABLET, EXTENDED RELEASE ORAL
Qty: 45 TABLET | Refills: 1 | Status: SHIPPED | OUTPATIENT
Start: 2021-05-26 | End: 2021-11-24

## 2021-07-28 ENCOUNTER — APPOINTMENT (OUTPATIENT)
Dept: CT IMAGING | Age: 66
End: 2021-07-28

## 2021-07-28 ENCOUNTER — HOSPITAL ENCOUNTER (EMERGENCY)
Age: 66
Discharge: HOME | End: 2021-07-28

## 2021-07-28 VITALS
TEMPERATURE: 97.9 F | HEART RATE: 75 BPM | DIASTOLIC BLOOD PRESSURE: 88 MMHG | SYSTOLIC BLOOD PRESSURE: 121 MMHG | HEIGHT: 67 IN | RESPIRATION RATE: 16 BRPM | OXYGEN SATURATION: 97 % | WEIGHT: 146 LBS | BODY MASS INDEX: 22.91 KG/M2

## 2021-07-28 DIAGNOSIS — R10.9 ABDOMINAL DISCOMFORT: ICD-10-CM

## 2021-07-28 DIAGNOSIS — K59.00 CONSTIPATION, UNSPECIFIED CONSTIPATION TYPE: Primary | ICD-10-CM

## 2021-07-28 LAB
A/G RATIO: 1.5 (ref 1.1–2.2)
ALBUMIN SERPL-MCNC: 4.5 G/DL (ref 3.4–5)
ALP BLD-CCNC: 100 U/L (ref 40–129)
ALT SERPL-CCNC: 23 U/L (ref 10–40)
ANION GAP SERPL CALCULATED.3IONS-SCNC: 13 MMOL/L (ref 3–16)
AST SERPL-CCNC: 24 U/L (ref 15–37)
BASOPHILS ABSOLUTE: 0 K/UL (ref 0–0.2)
BASOPHILS RELATIVE PERCENT: 0.2 %
BILIRUB SERPL-MCNC: 0.7 MG/DL (ref 0–1)
BILIRUBIN URINE: NEGATIVE
BLOOD, URINE: NEGATIVE
BUN BLDV-MCNC: 11 MG/DL (ref 7–20)
CALCIUM SERPL-MCNC: 9.5 MG/DL (ref 8.3–10.6)
CHLORIDE BLD-SCNC: 102 MMOL/L (ref 99–110)
CLARITY: CLEAR
CO2: 22 MMOL/L (ref 21–32)
COLOR: YELLOW
CREAT SERPL-MCNC: 0.8 MG/DL (ref 0.8–1.3)
EOSINOPHILS ABSOLUTE: 0.1 K/UL (ref 0–0.6)
EOSINOPHILS RELATIVE PERCENT: 0.9 %
GFR AFRICAN AMERICAN: >60
GFR NON-AFRICAN AMERICAN: >60
GLOBULIN: 3 G/DL
GLUCOSE BLD-MCNC: 106 MG/DL (ref 70–99)
GLUCOSE URINE: NEGATIVE MG/DL
HCT VFR BLD CALC: 47.6 % (ref 40.5–52.5)
HEMOGLOBIN: 16.3 G/DL (ref 13.5–17.5)
KETONES, URINE: NEGATIVE MG/DL
LEUKOCYTE ESTERASE, URINE: NEGATIVE
LIPASE: 58 U/L (ref 13–60)
LYMPHOCYTES ABSOLUTE: 2 K/UL (ref 1–5.1)
LYMPHOCYTES RELATIVE PERCENT: 23.6 %
MCH RBC QN AUTO: 31.8 PG (ref 26–34)
MCHC RBC AUTO-ENTMCNC: 34.3 G/DL (ref 31–36)
MCV RBC AUTO: 92.8 FL (ref 80–100)
MICROSCOPIC EXAMINATION: NORMAL
MONOCYTES ABSOLUTE: 0.8 K/UL (ref 0–1.3)
MONOCYTES RELATIVE PERCENT: 9.6 %
NEUTROPHILS ABSOLUTE: 5.5 K/UL (ref 1.7–7.7)
NEUTROPHILS RELATIVE PERCENT: 65.7 %
NITRITE, URINE: NEGATIVE
PDW BLD-RTO: 14.2 % (ref 12.4–15.4)
PH UA: 5.5 (ref 5–8)
PLATELET # BLD: 207 K/UL (ref 135–450)
PMV BLD AUTO: 8 FL (ref 5–10.5)
POTASSIUM SERPL-SCNC: 4.2 MMOL/L (ref 3.5–5.1)
PROTEIN UA: NEGATIVE MG/DL
RBC # BLD: 5.13 M/UL (ref 4.2–5.9)
SODIUM BLD-SCNC: 137 MMOL/L (ref 136–145)
SPECIFIC GRAVITY UA: 1.01 (ref 1–1.03)
TOTAL PROTEIN: 7.5 G/DL (ref 6.4–8.2)
URINE TYPE: NORMAL
UROBILINOGEN, URINE: 0.2 E.U./DL
WBC # BLD: 8.4 K/UL (ref 4–11)

## 2021-07-28 PROCEDURE — 85025 COMPLETE CBC W/AUTO DIFF WBC: CPT

## 2021-07-28 PROCEDURE — 80053 COMPREHEN METABOLIC PANEL: CPT

## 2021-07-28 PROCEDURE — 81003 URINALYSIS AUTO W/O SCOPE: CPT

## 2021-07-28 PROCEDURE — 2580000003 HC RX 258: Performed by: PHYSICIAN ASSISTANT

## 2021-07-28 PROCEDURE — 74177 CT ABD & PELVIS W/CONTRAST: CPT

## 2021-07-28 PROCEDURE — 6360000004 HC RX CONTRAST MEDICATION: Performed by: PHYSICIAN ASSISTANT

## 2021-07-28 PROCEDURE — 83690 ASSAY OF LIPASE: CPT

## 2021-07-28 PROCEDURE — 99283 EMERGENCY DEPT VISIT LOW MDM: CPT

## 2021-07-28 RX ORDER — POLYETHYLENE GLYCOL 3350 17 G/17G
17 POWDER, FOR SOLUTION ORAL DAILY
Qty: 510 G | Refills: 0 | Status: SHIPPED | OUTPATIENT
Start: 2021-07-28 | End: 2021-08-27

## 2021-07-28 RX ORDER — 0.9 % SODIUM CHLORIDE 0.9 %
1000 INTRAVENOUS SOLUTION INTRAVENOUS ONCE
Status: COMPLETED | OUTPATIENT
Start: 2021-07-28 | End: 2021-07-28

## 2021-07-28 RX ADMIN — SODIUM CHLORIDE 1000 ML: 9 INJECTION, SOLUTION INTRAVENOUS at 18:12

## 2021-07-28 RX ADMIN — IOPAMIDOL 75 ML: 755 INJECTION, SOLUTION INTRAVENOUS at 17:51

## 2021-07-28 ASSESSMENT — ENCOUNTER SYMPTOMS
ABDOMINAL PAIN: 1
CONSTIPATION: 1
COLOR CHANGE: 0
NAUSEA: 1
EYES NEGATIVE: 1
SHORTNESS OF BREATH: 0
VOMITING: 0
DIARRHEA: 0
BACK PAIN: 0

## 2021-07-28 NOTE — ED PROVIDER NOTES
negative. Except as noted above in the ROS, all other systems were reviewed and negative. PAST MEDICAL HISTORY:     Past Medical History:   Diagnosis Date    Back pain     Heart attack ([facility] [address]          SURGICAL HISTORY:      Past Surgical History:   Procedure Laterality Date    BACK SURGERY           CURRENT MEDICATIONS:       Discharge Medication List as of 7/28/2021  8:10 PM      CONTINUE these medications which have NOT CHANGED    Details   metoprolol succinate (TOPROL XL) 25 MG extended release tablet Take 1/2 (one-half) tablet by mouth once daily, Disp-45 tablet, R-1Normal      atorvastatin (LIPITOR) 40 MG tablet Take 1 tablet by mouth nightly, Disp-90 tablet,R-3Normal      lisinopril (PRINIVIL;ZESTRIL) 2.5 MG tablet Take 1 tablet by mouth daily, Disp-90 tablet,R-3Normal      clopidogrel (PLAVIX) 75 MG tablet Take 75 mg by mouth dailyHistorical Med      aspirin 81 MG EC tablet Take 1 tablet by mouth daily, Disp-30 tablet, R-11Normal      nitroGLYCERIN (NITROSTAT) 0.4 MG SL tablet Place 1 tablet under the tongue every 5 minutes as needed for Chest pain up to max of 3 total doses. If no relief after 1 dose, call 911., Disp-25 tablet, R-3Normal      acetaminophen (TYLENOL) 325 MG tablet Take 650 mg by mouth every 6 hours as needed. Historical Med               ALLERGIES:    Demerol hcl [meperidine], Codeine, and Percocet [oxycodone-acetaminophen]    FAMILY HISTORY:       Family History   Problem Relation Age of Onset    COPD Mother     Cancer Father           SOCIAL HISTORY:       Social History     Socioeconomic History    Marital status:      Spouse name: None    Number of children: None    Years of education: None    Highest education level: None   Occupational History    None   Tobacco Use    Smoking status: Current Every Day Smoker     Packs/day: 1.00     Types: Cigarettes    Smokeless tobacco: Never Used   Vaping Use    Vaping Use: Never used   Substance and Sexual Activity    Alcohol use: No    Drug use: No    Sexual activity: None   Other Topics Concern    None   Social History Narrative    None     Social Determinants of Health     Financial Resource Strain:     Difficulty of Paying Living Expenses:    Food Insecurity:     Worried About Running Out of Food in the Last Year:     [address] in the Last Year:    Transportation Needs:     Lack of Transportation (Medical):  Lack of Transportation (Non-Medical):    Physical Activity:     Days of Exercise per Week:     Minutes of Exercise per Session:    Stress:     Feeling of Stress :    Social Connections:     Frequency of Communication with Friends and Family:     Frequency of Social Gatherings with Friends and Family:     Attends [employer] Services:     Active Member of Clubs or Organizations:     Attends Club or Organization Meetings:     Marital Status:    Intimate Partner Violence:     Fear of Current or Ex-Partner:     Emotionally Abused:     Physically Abused:     Sexually Abused:        SCREENINGS:             PHYSICAL EXAM:       ED Triage Vitals [07/28/21 1433]   BP Temp Temp Source Pulse Resp SpO2 Height Weight   138/76 97.9 °F (36.6 °C) Oral (!) 7 16 95 % 5' 7\" (1.702 m) 146 lb (66.2 kg)       Physical Exam    CONSTITUTIONAL: Awake and alert. Cooperative. Well-developed. Well-nourished. Non-toxic. No acute distress. HENT: Normocephalic. Atraumatic. External ears normal, without discharge. No nasal discharge. Oropharynx clear. Mucous membranes moist.  EYES: Conjunctiva non-injected. No scleral icterus. PERRL. EOM's grossly intact. NECK: Supple. Normal ROM. CARDIOVASCULAR: RRR. No Murmer. Intact distal pulses. PULMONARY/CHEST WALL: Effort normal. No tachypnea. Lungs clear to ausculation. ABDOMEN: Normal BS. Soft. Nondistended. No tenderness to palpate. No guarding. /ANORECTAL: Not assessed  MUSKULOSKELETAL: Normal ROM. No acute deformities. No edema. No tenderness to palpate.   SKIN: Warm and dry. No rash. NEUROLOGICAL: Alert and oriented x 3. GCS 15. CN II-XII grossly intact. Strength is 5/5 in all extremities and sensation is intact. Normal gait.    PSYCHIATRIC: Normal affect        DIAGNOSTICRESULTS:     LABS:    Results for orders placed or performed during the hospital encounter of 07/28/21   CBC auto differential   Result Value Ref Range    WBC 8.4 4.0 - 11.0 K/uL    RBC 5.13 4.20 - 5.90 M/uL    Hemoglobin 16.3 13.5 - 17.5 g/dL    Hematocrit 47.6 40.5 - 52.5 %    MCV 92.8 80.0 - 100.0 fL    MCH 31.8 26.0 - 34.0 pg    MCHC 34.3 31.0 - 36.0 g/dL    RDW 14.2 12.4 - 15.4 %    Platelets 744 731 - 780 K/uL    MPV 8.0 5.0 - 10.5 fL    Neutrophils % 65.7 %    Lymphocytes % 23.6 %    Monocytes % 9.6 %    Eosinophils % 0.9 %    Basophils % 0.2 %    Neutrophils Absolute 5.5 1.7 - 7.7 K/uL    Lymphocytes Absolute 2.0 1.0 - 5.1 K/uL    Monocytes Absolute 0.8 0.0 - 1.3 K/uL    Eosinophils Absolute 0.1 0.0 - 0.6 K/uL    Basophils Absolute 0.0 0.0 - 0.2 K/uL   Comprehensive metabolic panel   Result Value Ref Range    Sodium 137 136 - 145 mmol/L    Potassium 4.2 3.5 - 5.1 mmol/L    Chloride 102 99 - 110 mmol/L    CO2 22 21 - 32 mmol/L    Anion Gap 13 3 - 16    Glucose 106 (H) 70 - 99 mg/dL    BUN 11 7 - 20 mg/dL    CREATININE 0.8 0.8 - 1.3 mg/dL    GFR Non-African American >60 >60    GFR African American >60 >60    Calcium 9.5 8.3 - 10.6 mg/dL    Total Protein 7.5 6.4 - 8.2 g/dL    Albumin 4.5 3.4 - 5.0 g/dL    Albumin/Globulin Ratio 1.5 1.1 - 2.2    Total Bilirubin 0.7 0.0 - 1.0 mg/dL    Alkaline Phosphatase 100 40 - 129 U/L    ALT 23 10 - 40 U/L    AST 24 15 - 37 U/L    Globulin 3.0 g/dL   Lipase   Result Value Ref Range    Lipase 58.0 13.0 - 60.0 U/L   Urinalysis, reflex to microscopic   Result Value Ref Range    Color, UA Yellow Straw/Yellow    Clarity, UA Clear Clear    Glucose, Ur Negative Negative mg/dL    Bilirubin Urine Negative Negative    Ketones, Urine Negative Negative mg/dL    Specific [place], UA 1.010 1.005 - 1.030    Blood, Urine Negative Negative    pH, UA 5.5 5.0 - 8.0    Protein, UA Negative Negative mg/dL    Urobilinogen, Urine 0.2 <2.0 E.U./dL    Nitrite, Urine Negative Negative    Leukocyte Esterase, Urine Negative Negative    Microscopic Examination Not Indicated     Urine Type NotGiven          RADIOLOGY:  All x-ray studies areviewed/reviewed by me. Formal interpretations per the radiologist are as follows:      CT ABDOMEN PELVIS W IV CONTRAST Additional Contrast? None    Result Date: 7/28/2021  EXAMINATION: CT OF THE ABDOMEN AND PELVIS WITH CONTRAST 7/28/2021 5:42 pm TECHNIQUE: CT of the abdomen and pelvis was performed with the administration of intravenous contrast. Multiplanar reformatted images are provided for review. Dose modulation, iterative reconstruction, and/or weight based adjustment of the mA/kV was utilized to reduce the radiation dose to as low as reasonably achievable. COMPARISON: None. HISTORY: ORDERING SYSTEM PROVIDED HISTORY: abdominal pain, \"constipation\" TECHNOLOGIST PROVIDED HISTORY: Reason for exam:->abdominal pain, \"constipation\" Additional Contrast?->None Decision Support Exception - unselect if not a suspected or confirmed emergency medical condition->Emergency Medical Condition (MA) Reason for Exam: constipation x 1 week -abd pain-nausea Acuity: Acute Type of Exam: Initial Relevant Medical/Surgical History: back surg FINDINGS: Lower Chest:   There mild linear densities along the lung bases anterior laterally. Organs: The liver is mildly enlarged with hypertrophy of the caudate and left lobes and fatty replacement throughout. The gallbladder, pancreas, and bile ducts are normal.  The spleen is unremarkable. The adrenals are normal. The kidneys are normal size with no hydronephrosis, renal stone, or mass. The ureters are normal caliber.   There are metallic surgical fixation devices along the lower lumbar spine which is causing a large amount of streak artifact and is partially obscuring the lower abdomen and pelvis. GI/Bowel: There are small air-fluid levels throughout the bowel with no significant dilatation. There is moderate feces scattered throughout the colon. There are diverticula along the sigmoid colon with no pericolonic inflammation. There is no obstruction. The mesentery is unremarkable. Pelvis: The bladder is unremarkable. The prostate gland is borderline enlarged. There are questionable small inguinal hernias bilaterally with small loop of colon partially extending into the hernia on the right which appears normal caliber with no wall thickening. The appendix is grossly unremarkable with no pericecal inflammation. Bones/soft tissues: Postop changes are seen along the lower lumbar spine with prominent subluxation of L5 on S1 and surgical fixation devices posteriorly at L4 and S1 which appear in good position Peritoneum/Retroperitoneum:   There is moderate calcified plaque throughout the aorta which is normal caliber with no aneurysm or dissection and no periaortic mass or adenopathy. Small air-fluid levels throughout the bowel which could be due to gastroenteritis or possible early ileus. Recommend follow-up clinically. Mild constipation with no obstruction. Mild chronic liver changes with fatty replacement throughout. Scattered diverticula throughout the left colon with no pericolonic inflammation. Questionable small inguinal hernias bilaterally with a small loop of colon partially extending into the inguinal hernia on the right with no obstruction or wall thickening. Extensive postop changes lower lumbar spine with grade 2 spondylolisthesis of L5 on S1 with a large amount of metallic streak artifact partially obscuring the lower abdomen and pelvis.          PROCEDURES:   N/A    CRITICAL CARE TIME:       None      CONSULTS:  None      EMERGENCY DEPARTMENT COURSE and DIFFERENTIAL DIAGNOSIS/MDM:   Vitals:    Vitals:    07/28/21 1433 07/28/21 1930   BP: 138/76 121/88   Pulse: (!) 7 75   Resp: 16 16   Temp: 97.9 °F (36.6 °C)    TempSrc: Oral    SpO2: 95% 97%   Weight: 146 lb (66.2 kg)    Height: 5' 7\" (1.702 m)        Patient was given the following medications:  Medications   0.9 % sodium chloride bolus (0 mLs Intravenous Stopped 7/28/21 1930)   iopamidol (ISOVUE-370) 76 % injection 75 mL (75 mLs Intravenous Given 7/28/21 1751)         I have evaluated this patient in the ED  Old records were reviewed. This patient is here with a change in his bowel habits. He reports he has had to use a laxative in order to pass stool for the last 1 week. He is not in pain. He has some \"queasiness\" but no vomiting. He expresses concern for colon cancer stating his father has a history of colon cancer and on one occasion did note a little blood in his stool. Based on his concerns work-up is initiated. Patient given 1 L normal saline IV in the ED. CBC white count 8.4 with H&H 16.3 and 47.6  CMP normal  Lipase normal at 58  Urinalysis normal  CT abdomen and pelvis with IV contrast shows:  Small air-fluid levels throughout the bowel which could be due to   gastroenteritis or possible early ileus.  Recommend follow-up clinically. Mild constipation with no obstruction. Mild chronic liver changes with fatty replacement throughout. Scattered diverticula throughout the left colon with no pericolonic   inflammation. Questionable small inguinal hernias bilaterally with a small loop of colon   partially extending into the inguinal hernia on the right with no obstruction   or wall thickening. Extensive postop changes lower lumbar spine with grade 2 spondylolisthesis of   L5 on S1 with a large amount of metallic streak artifact partially obscuring   the lower abdomen and pelvis. I reviewed results with the patient. I will plan to start him on MiraLAX in order to try and help get his bowel pattern into more regular pattern.   I will give him a GI referral as well as

## 2021-07-29 RX ORDER — CLOPIDOGREL BISULFATE 75 MG/1
TABLET ORAL
Qty: 90 TABLET | Refills: 1 | Status: SHIPPED | OUTPATIENT
Start: 2021-07-29 | End: 2022-01-27

## 2021-07-29 RX ORDER — LISINOPRIL 2.5 MG/1
TABLET ORAL
Qty: 90 TABLET | Refills: 1 | Status: SHIPPED | OUTPATIENT
Start: 2021-07-29 | End: 2022-01-27

## 2021-07-29 NOTE — ED NOTES
Pt scripts x1 instructed to follow up with PCP[facility]. Assessed per Kiel KESSLER.      Sharee Dyson LPN  49/96/67 [id number]

## 2021-08-13 RX ORDER — ATORVASTATIN CALCIUM 40 MG/1
40 TABLET, FILM COATED ORAL NIGHTLY
Qty: 90 TABLET | Refills: 1 | Status: SHIPPED | OUTPATIENT
Start: 2021-08-13 | End: 2022-02-10

## 2021-11-24 RX ORDER — METOPROLOL SUCCINATE 25 MG/1
TABLET, EXTENDED RELEASE ORAL
Qty: 45 TABLET | Refills: 0 | Status: SHIPPED | OUTPATIENT
Start: 2021-11-24 | End: 2022-02-28

## 2022-01-27 RX ORDER — CLOPIDOGREL BISULFATE 75 MG/1
TABLET ORAL
Qty: 90 TABLET | Refills: 0 | Status: SHIPPED | OUTPATIENT
Start: 2022-01-27 | End: 2022-04-29

## 2022-01-27 RX ORDER — LISINOPRIL 2.5 MG/1
TABLET ORAL
Qty: 90 TABLET | Refills: 0 | Status: SHIPPED | OUTPATIENT
Start: 2022-01-27 | End: 2022-04-29

## 2022-02-09 DIAGNOSIS — Z79.899 MEDICATION MANAGEMENT: Primary | ICD-10-CM

## 2022-02-10 RX ORDER — ATORVASTATIN CALCIUM 40 MG/1
40 TABLET, FILM COATED ORAL NIGHTLY
Qty: 90 TABLET | Refills: 1 | Status: SHIPPED | OUTPATIENT
Start: 2022-02-10 | End: 2022-08-15

## 2022-02-10 NOTE — TELEPHONE ENCOUNTER
Pt/pharmacy requesting atorvastatin 40 mg tab. Pending script send to [facility].       LOV 12/17/20  Last Lipid- 10/14/19  No upcoming OV scheduled

## 2022-02-25 DIAGNOSIS — I25.10 CORONARY ARTERY DISEASE INVOLVING NATIVE CORONARY ARTERY OF NATIVE HEART WITHOUT ANGINA PECTORIS: Primary | ICD-10-CM

## 2022-02-28 RX ORDER — METOPROLOL SUCCINATE 25 MG/1
TABLET, EXTENDED RELEASE ORAL
Qty: 45 TABLET | Refills: 0 | Status: SHIPPED | OUTPATIENT
Start: 2022-02-28 | End: 2022-05-31

## 2022-02-28 NOTE — TELEPHONE ENCOUNTER
Last OV SRJ 12/17/2020    Attempted to call pt to scheduled yearly follow up. No answer. No voicemail set up.

## 2022-04-28 DIAGNOSIS — I21.3 ST ELEVATION MYOCARDIAL INFARCTION (STEMI), UNSPECIFIED ARTERY (HCC): ICD-10-CM

## 2022-04-28 DIAGNOSIS — I25.10 CORONARY ARTERY DISEASE INVOLVING NATIVE CORONARY ARTERY OF NATIVE HEART WITHOUT ANGINA PECTORIS: Primary | ICD-10-CM

## 2022-04-28 DIAGNOSIS — I25.5 CARDIOMYOPATHY, ISCHEMIC: ICD-10-CM

## 2022-04-29 RX ORDER — LISINOPRIL 2.5 MG/1
TABLET ORAL
Qty: 30 TABLET | Refills: 0 | Status: SHIPPED | OUTPATIENT
Start: 2022-04-29 | End: 2022-05-31

## 2022-04-29 RX ORDER — CLOPIDOGREL BISULFATE 75 MG/1
TABLET ORAL
Qty: 30 TABLET | Refills: 0 | Status: SHIPPED | OUTPATIENT
Start: 2022-04-29 | End: 2022-05-31

## 2022-04-29 NOTE — TELEPHONE ENCOUNTER
Last visit virtual [address] 12/17/2020    Attempted to call pt to schedule f/u to get med refills. No answer. N o voicemail.

## 2022-04-29 NOTE — TELEPHONE ENCOUNTER
Pt called back into [facility] and has been scheduled for CHENCHO olivas/PRATEEK @ [facility] on 06/08 10:30am

## 2022-05-30 DIAGNOSIS — I25.5 CARDIOMYOPATHY, ISCHEMIC: ICD-10-CM

## 2022-05-30 DIAGNOSIS — I25.10 CORONARY ARTERY DISEASE INVOLVING NATIVE CORONARY ARTERY OF NATIVE HEART WITHOUT ANGINA PECTORIS: ICD-10-CM

## 2022-05-30 DIAGNOSIS — I21.3 ST ELEVATION MYOCARDIAL INFARCTION (STEMI), UNSPECIFIED ARTERY (HCC): ICD-10-CM

## 2022-05-31 DIAGNOSIS — I25.5 CARDIOMYOPATHY, ISCHEMIC: ICD-10-CM

## 2022-05-31 DIAGNOSIS — I25.10 CORONARY ARTERY DISEASE INVOLVING NATIVE CORONARY ARTERY OF NATIVE HEART WITHOUT ANGINA PECTORIS: ICD-10-CM

## 2022-05-31 DIAGNOSIS — I21.3 ST ELEVATION MYOCARDIAL INFARCTION (STEMI), UNSPECIFIED ARTERY (HCC): ICD-10-CM

## 2022-05-31 RX ORDER — METOPROLOL SUCCINATE 25 MG/1
12.5 TABLET, EXTENDED RELEASE ORAL DAILY
Qty: 45 TABLET | Refills: 3 | Status: SHIPPED | OUTPATIENT
Start: 2022-05-31

## 2022-05-31 RX ORDER — METOPROLOL SUCCINATE 25 MG/1
25 TABLET, EXTENDED RELEASE ORAL DAILY
Qty: 45 TABLET | Refills: 5 | Status: SHIPPED | OUTPATIENT
Start: 2022-05-31 | End: 2022-05-31

## 2022-05-31 RX ORDER — CLOPIDOGREL BISULFATE 75 MG/1
75 TABLET ORAL DAILY
Qty: 30 TABLET | Refills: 5 | Status: SHIPPED | OUTPATIENT
Start: 2022-05-31

## 2022-05-31 RX ORDER — LISINOPRIL 2.5 MG/1
2.5 TABLET ORAL DAILY
Qty: 30 TABLET | Refills: 5 | Status: SHIPPED | OUTPATIENT
Start: 2022-05-31

## 2022-05-31 NOTE — TELEPHONE ENCOUNTER
Kaykay from [address] contacted [address] requesting a call from MA/RN regarding the refill script on Metoprolo.     Please advise [facility]. [phone]

## 2022-05-31 NOTE — TELEPHONE ENCOUNTER
Last OV 12/17/2020. Called and spoke with Barbara he was calling to verfiy dosage change of metoprolol XL from 12.5 mg to 25 mg once daily. ~ Spoke with patient he states taking metoprolol 12.5 mg daily (half tablet) upon review of home medications correlated with last office visit home medication 12/17/20. Medication pending.

## 2022-05-31 NOTE — TELEPHONE ENCOUNTER
Pt/pharmacy requesting metoprolol succinate 25 mg tab, clopidogrel 75 mg tab, lisinopril 2.5 mg tab. Pending script to [facility] DR MATT NICHOLS.     Last OV/VV 12/17/20  Last labs, CBC/MCP 7/28/21  Next OV 6/8/22

## 2022-06-06 NOTE — PROGRESS NOTES
[facility]   CARDIAC EVALUATION NOTE  [phone]      PCP:  No primary care provider on file. Reason for Consultation/Chief Complaint: Follow up, STEMI, cardiomyopathy    Subjective   History of Present Illness:  Nabor Mason is a 74 y.o. patient with a history of CAD with STEMI who presents for hospital follow up. His echo from 10/13/19 showed his EF was 40% with hypokinesis of the mid and basal inferoseptal and anteroseptal wall segments. Grade I DD. His cardiac cath from 10/13/19 resulted in a successful PCI to proximal RCA with GIUSEPPE and distal RCA with GIUSEPPE. OV 12/19/19 reported he had stopped smoking. He denied recurrent CP, palpitations, dizziness or syncope. He reported he had SOB with brilinta and late at night. He reported he lost his insurance after changing jobs. VV 5/1/20 he reported having occasional dizziness. Reported that he was taking 1/2 tablet of Brilinta twice daily, became sob when he was taking a full tablet. Reported one episode of chest discomfort that felt like gravel across his chest.    VV 12/17/20 he reported feeling well until a month prior. Reported having dizziness which caused him to fall. Reported that he had balance issues frequently. Reported CP and palpitations with the episodes. Today he reports that he has been doing okay. No problems with breathing or chest pain. He reports that he is taking all medications the same including cardiac medications. He reports that he recently had his meds refilled. Has had some concerns of circulation in neck and legs. Has been a smoker. Past Medical History:   has a past medical history of Back pain and Heart attack ([address] Surgical History:   has a past surgical history that includes back surgery. Social History:   reports that he has been smoking cigarettes. He has been smoking about 1.00 pack per day. He has never used smokeless tobacco. He reports that he does not drink alcohol and does not use drugs. Family History:  family history includes COPD in his mother; Cancer in his father. Home Medications:  Were reviewed and are listed in nursing record and/or below  Prior to Admission medications    Medication Sig Start Date End Date Taking? Authorizing Provider   lisinopril (PRINIVIL;ZESTRIL) 2.5 MG tablet Take 1 tablet by mouth daily 5/31/22  Yes Nila Crandall MD   clopidogrel (PLAVIX) 75 MG tablet Take 1 tablet by mouth daily 5/31/22  Yes Nila Crandall MD   metoprolol succinate (TOPROL XL) 25 MG extended release tablet Take 0.5 tablets by mouth daily 5/31/22  Yes Nila Crandall MD   atorvastatin (LIPITOR) 40 MG tablet Take 1 tablet by mouth nightly 2/10/22  Yes Nila Crandall MD   aspirin 81 MG EC tablet Take 1 tablet by mouth daily 10/15/19  Yes STEFANO Owens CNP   nitroGLYCERIN (NITROSTAT) 0.4 MG SL tablet Place 1 tablet under the tongue every 5 minutes as needed for Chest pain up to max of 3 total doses. If no relief after 1 dose, call 911. 10/14/19  Yes STEFANO Potter CNP   acetaminophen (TYLENOL) 325 MG tablet Take 650 mg by mouth every 6 hours as needed. Yes Historical Provider, MD          Allergies:  Demerol hcl [meperidine], Codeine, and Percocet [oxycodone-acetaminophen]     Review of Systems:   A 14 point review of symptoms completed. Pertinent positives identified in the HPI, all other review of symptoms negative as below.       Objective   PHYSICAL EXAM:    Vitals:    06/08/22 1042   BP: 132/76   Pulse: 80   SpO2: 96%    Weight: 139 lb 8 oz (63.3 kg)         General Appearance:  Alert, cooperative, no distress, appears stated age   Head:  Normocephalic, without obvious abnormality, atraumatic   Eyes:  PERRL, conjunctiva/corneas clear   Nose: Nares normal, no drainage or sinus tenderness   Neck: Supple, symmetrical, trachea midline, no adenopathy, thyroid: not enlarged, symmetric, no tenderness/mass/nodules, + carotid bruit but no JVD   Lungs:   Fine bilat wheeze , respirations unlabored   Chest Wall:  No deformity or tenderness   Heart:  Regular rate and rhythm, S1, S2 normal, no murmur, rub or gallop   Abdomen:   Soft, non-tender, bowel sounds active all four quadrants,  no masses, no organomegaly   Extremities: Extremities normal, atraumatic, no cyanosis or edema   Pulses: 2+ and symmetric   Skin: Skin faint ecchymoses on arms    Pysch: Normal mood and affect   Neurologic: Normal gross motor and sensory exam.         Labs   CBC:   Lab Results   Component Value Date    WBC 8.4 07/28/2021    RBC 5.13 07/28/2021    HGB 16.3 07/28/2021    HCT 47.6 07/28/2021    MCV 92.8 07/28/2021    RDW 14.2 07/28/2021     07/28/2021     CMP:  Lab Results   Component Value Date     07/28/2021    K 4.2 07/28/2021    K 4.1 10/14/2019     07/28/2021    CO2 22 07/28/2021    BUN 11 07/28/2021    CREATININE 0.8 07/28/2021    GFRAA >60 07/28/2021    GFRAA >60 05/14/2013    AGRATIO 1.5 07/28/2021    LABGLOM >60 07/28/2021    GLUCOSE 106 07/28/2021    PROT 7.5 07/28/2021    CALCIUM 9.5 07/28/2021    BILITOT 0.7 07/28/2021    ALKPHOS 100 07/28/2021    AST 24 07/28/2021    ALT 23 07/28/2021     PT/INR:  No results found for: PTINR  HgBA1c:No results found for: LABA1C  Lab Results   Component Value Date    TROPONINI 0.03 (H) 10/13/2019         Cardiac Data     Last EKG: 10/2019, nsr, infer infarct        Echo: 10/13/19   Left ventricular systolic function is mildly reduced with a visually   estimated ejection fraction of 40 %. The left ventricle is normal in size with normal wall thickness. Hypokinesis of the mid and basal inferoseptal and anteroseptal wall   segments. Grade I diastolic dysfunction with normal LV pressure. Mild tricuspid regurgitation. Systolic pulmonary artery pressure (SPAP) is normal and estimated at 18 mmHg   (right atrial pressure 3 mmHg). Stress Test:5/17/13  Summary    Myocardial perfusion imaging is similar at rest and stress.  There is no  evidence for ischemia.    There is slight apical thinning noted at rest and stress.    Left ventricular ejection fraction of 67 %. rest and stress.               Cath: 10/13/19  Cardiac Cath with PCI, LVG:  Anatomy:   LM-Ostial 40%, mid Anuerysmal   LAD-Patent  Cx-Patent  OM- Patent  RCA-Dominant, Proximal 100% thrombotic occluded  RPDA- No disease  LVEF- 60%  LVG- Inferobasal akinesis  LVEDP- 20     Intervention  ~Successful PCI to proximal RCA with 3.5x18 GIUSEPPE post dilated with 3.75x12 NC balloon to 16atm and distal RCA with 3.5x28 GIUSEPPE to 12atm. MULUGETA 0 to MULUGETA 3, 100% down to 0%. Excellent Result. Contrast: 190ml  Flouro Time: 17.3m  Access: R radial     Impression  ~Coronary Angiography w/ STEMI of RCA, 100% thrombotic occlusion. Moderate ostial LM stenosis  ~LVG with LVEF of 60% and inferobasal regional wall motion abnormalities  ~Successful complex angioplasty and stenting of RCA           Recommendation  ~Aggressive medical treatment and risk factor modification  ~1. Cont aggrastat for 6 hours. Post cath IVF. Bedrest.  2. Recommend beta blocker, high potency statin, aspirin and Brilinta for 12 months  3. Referral to cardiac rehab placed  4. Patient has been advised on the importance of regular exercise of at least 20-30 minutes daily. 5. Patient counseled about and offered assistance for smoking cessation   4. Follow up in 1-2 weeks with cardiology            Studies:       I have reviewed labs and imaging/xray/diagnostic testing in this note. Assessment      1. Coronary artery disease involving native coronary artery of native heart without angina pectoris    2. Cardiomyopathy, ischemic    3. ST elevation myocardial infarction involving right coronary artery (HCC)    carotid bruit                Plan   1. Cartoid ultrasound of the neck for bruit   2. PRISCILLA for claudication  3. Abdominal ultraound for smoker, AAA   4. Lft, Tsh, lipid fasting  5. Call [phone] to schedule all the tests  6.  Follow up with NP 3 months                Scribe's attestation: This note was scribed in the presence of Dr. Elenita Ramirez MD  by Lakeisha France LPN      Thank you for allowing us to participate in the care of Lucio Austin. Please call me with any questions [phone]. Elenita Ramirez MD, [facility]   Interventional Cardiologist  [place]   [phone] [facility]  [phone] [address]  6/8/2022 10:59 AM    I will address the patient's cardiac risk factors and adjusted pharmacologic treatment as needed. In addition, I have reinforced the need for patient directed risk factor modification. Tobacco use was discussed with the patient and educated on the negative effects and was asked not to use. All questions and concerns were addressed to the patient/family. Alternatives to my treatment were discussed. I, Dr Elenita Ramirez, personally performed the services described in this documentation, as scribed by the above signed scribe in my presence. It is both accurate and complete to my knowledge. I agree with the details independently gathered by the clinical support staff and the scribed note accurately describes my personal service to the patient.

## 2022-06-08 ENCOUNTER — OFFICE VISIT (OUTPATIENT)
Dept: CARDIOLOGY CLINIC | Age: 67
End: 2022-06-08
Payer: COMMERCIAL

## 2022-06-08 VITALS
HEIGHT: 67 IN | BODY MASS INDEX: 21.9 KG/M2 | DIASTOLIC BLOOD PRESSURE: 76 MMHG | OXYGEN SATURATION: 96 % | HEART RATE: 80 BPM | SYSTOLIC BLOOD PRESSURE: 132 MMHG | WEIGHT: 139.5 LBS

## 2022-06-08 DIAGNOSIS — I25.5 CARDIOMYOPATHY, ISCHEMIC: ICD-10-CM

## 2022-06-08 DIAGNOSIS — G97.52 POSTPROCEDURAL HEMORRHAGE OF A NERVOUS SYSTEM ORGAN OR STRUCTURE FOLLOWING OTHER PROCEDURE: ICD-10-CM

## 2022-06-08 DIAGNOSIS — R47.89 OTHER SPEECH DISTURBANCES: ICD-10-CM

## 2022-06-08 DIAGNOSIS — I50.89 OTHER HEART FAILURE (HCC): ICD-10-CM

## 2022-06-08 DIAGNOSIS — I21.11 ST ELEVATION MYOCARDIAL INFARCTION INVOLVING RIGHT CORONARY ARTERY (HCC): ICD-10-CM

## 2022-06-08 DIAGNOSIS — I25.10 CORONARY ARTERY DISEASE INVOLVING NATIVE CORONARY ARTERY OF NATIVE HEART WITHOUT ANGINA PECTORIS: Primary | ICD-10-CM

## 2022-06-08 PROCEDURE — 1123F ACP DISCUSS/DSCN MKR DOCD: CPT | Performed by: INTERNAL MEDICINE

## 2022-06-08 PROCEDURE — 3017F COLORECTAL CA SCREEN DOC REV: CPT | Performed by: INTERNAL MEDICINE

## 2022-06-08 PROCEDURE — 99214 OFFICE O/P EST MOD 30 MIN: CPT | Performed by: INTERNAL MEDICINE

## 2022-06-08 PROCEDURE — G8420 CALC BMI NORM PARAMETERS: HCPCS | Performed by: INTERNAL MEDICINE

## 2022-06-08 PROCEDURE — 4004F PT TOBACCO SCREEN RCVD TLK: CPT | Performed by: INTERNAL MEDICINE

## 2022-06-08 PROCEDURE — G8427 DOCREV CUR MEDS BY ELIG CLIN: HCPCS | Performed by: INTERNAL MEDICINE

## 2022-06-08 NOTE — PATIENT INSTRUCTIONS
Plan   1. Cartoid ultrasound of the neck  2. PRISCILLA  3. Abdominal ultraound  4. Lft, Tsh, lipid fasting  5. Call [phone] to schedule all the tests  6.  Follow up with NP 3 months

## 2022-07-18 ENCOUNTER — HOSPITAL ENCOUNTER (OUTPATIENT)
Age: 67
Discharge: HOME | End: 2022-07-18
Payer: COMMERCIAL

## 2022-07-18 ENCOUNTER — HOSPITAL ENCOUNTER (OUTPATIENT)
Dept: ULTRASOUND IMAGING | Age: 67
Discharge: HOME | End: 2022-07-18
Payer: COMMERCIAL

## 2022-07-18 ENCOUNTER — HOSPITAL ENCOUNTER (OUTPATIENT)
Dept: VASCULAR LAB | Age: 67
Discharge: HOME | End: 2022-07-18
Payer: COMMERCIAL

## 2022-07-18 DIAGNOSIS — I25.10 CORONARY ARTERY DISEASE INVOLVING NATIVE CORONARY ARTERY OF NATIVE HEART WITHOUT ANGINA PECTORIS: ICD-10-CM

## 2022-07-18 DIAGNOSIS — G97.52 POSTPROCEDURAL HEMORRHAGE OF A NERVOUS SYSTEM ORGAN OR STRUCTURE FOLLOWING OTHER PROCEDURE: ICD-10-CM

## 2022-07-18 DIAGNOSIS — R47.89 OTHER SPEECH DISTURBANCES: ICD-10-CM

## 2022-07-18 DIAGNOSIS — I50.89 OTHER HEART FAILURE (HCC): ICD-10-CM

## 2022-07-18 LAB
ALBUMIN SERPL-MCNC: 4.1 G/DL (ref 3.4–5)
ALP BLD-CCNC: 94 U/L (ref 40–129)
ALT SERPL-CCNC: 22 U/L (ref 10–40)
AST SERPL-CCNC: 21 U/L (ref 15–37)
BILIRUB SERPL-MCNC: 0.7 MG/DL (ref 0–1)
BILIRUBIN DIRECT: <0.2 MG/DL (ref 0–0.3)
BILIRUBIN, INDIRECT: NORMAL MG/DL (ref 0–1)
CHOLESTEROL, FASTING: 115 MG/DL (ref 0–199)
HDLC SERPL-MCNC: 52 MG/DL (ref 40–60)
LDL CHOLESTEROL CALCULATED: 47 MG/DL
TOTAL PROTEIN: 6.7 G/DL (ref 6.4–8.2)
TRIGLYCERIDE, FASTING: 82 MG/DL (ref 0–150)
TSH REFLEX FT4: 0.77 UIU/ML (ref 0.27–4.2)
VLDLC SERPL CALC-MCNC: 16 MG/DL

## 2022-07-18 PROCEDURE — 76775 US EXAM ABDO BACK WALL LIM: CPT

## 2022-07-18 PROCEDURE — 80061 LIPID PANEL: CPT

## 2022-07-18 PROCEDURE — 36415 COLL VENOUS BLD VENIPUNCTURE: CPT

## 2022-07-18 PROCEDURE — 93880 EXTRACRANIAL BILAT STUDY: CPT

## 2022-07-18 PROCEDURE — 93922 UPR/L XTREMITY ART 2 LEVELS: CPT

## 2022-07-18 PROCEDURE — 84443 ASSAY THYROID STIM HORMONE: CPT

## 2022-07-18 PROCEDURE — 80076 HEPATIC FUNCTION PANEL: CPT

## 2022-07-18 NOTE — RESULT ENCOUNTER NOTE
Let patient know their abd u/s test shows No evidence of abdominal aortic aneurysm, continue current meds, no new orders or changes at this time. Thanks.
Spontaneous, unlabored and symmetrical

## 2022-07-19 ENCOUNTER — TELEPHONE (OUTPATIENT)
Dept: CARDIOLOGY CLINIC | Age: 67
End: 2022-07-19

## 2022-07-19 NOTE — TELEPHONE ENCOUNTER
----- Message from Josh Esquivel MD sent at 7/18/2022  4:07 PM EDT -----  Let patient know their CUS test is normal, continue current meds, no new orders or changes at this time. Thanks.     Please notify patient that their lab results are normal.

## 2022-07-19 NOTE — TELEPHONE ENCOUNTER
Called home phone number, vm is not set up. Unable to leave a message. Will try to contact at another time to relay lab and CUS results.

## 2022-07-19 NOTE — TELEPHONE ENCOUNTER
----- Message from Aleida Brumfield MD sent at 7/18/2022  4:07 PM EDT -----  Let patient know their CUS test is normal, continue current meds, no new orders or changes at this time. Thanks.

## 2022-07-19 NOTE — TELEPHONE ENCOUNTER
----- Message from Sunshine Unger MD sent at 7/18/2022 12:11 PM EDT -----  Please notify patient that their lab results are normal.

## 2022-07-20 ENCOUNTER — TELEPHONE (OUTPATIENT)
Dept: CARDIOLOGY CLINIC | Age: 67
End: 2022-07-20

## 2022-07-20 NOTE — TELEPHONE ENCOUNTER
Tried to contact pt at both numbers we have listed. Was unable to reach pt. L/m on pt spouse phone for him to please return our call regarding results.

## 2022-07-20 NOTE — TELEPHONE ENCOUNTER
----- Message from Soraida Naylor MD sent at 7/19/2022  5:27 PM EDT -----  Let patient know their PRISCILLA test is normal, continue current meds, no new orders or changes at this time. Thanks.

## 2022-08-15 DIAGNOSIS — Z79.899 MEDICATION MANAGEMENT: ICD-10-CM

## 2022-08-15 DIAGNOSIS — I25.10 CORONARY ARTERY DISEASE INVOLVING NATIVE CORONARY ARTERY OF NATIVE HEART WITHOUT ANGINA PECTORIS: Primary | ICD-10-CM

## 2022-08-15 RX ORDER — ATORVASTATIN CALCIUM 40 MG/1
40 TABLET, FILM COATED ORAL NIGHTLY
Qty: 90 TABLET | Refills: 3 | Status: SHIPPED | OUTPATIENT
Start: 2022-08-15

## 2022-11-26 DIAGNOSIS — I21.3 ST ELEVATION MYOCARDIAL INFARCTION (STEMI), UNSPECIFIED ARTERY (HCC): ICD-10-CM

## 2022-11-26 DIAGNOSIS — I25.10 CORONARY ARTERY DISEASE INVOLVING NATIVE CORONARY ARTERY OF NATIVE HEART WITHOUT ANGINA PECTORIS: ICD-10-CM

## 2022-11-26 DIAGNOSIS — I25.5 CARDIOMYOPATHY, ISCHEMIC: ICD-10-CM

## 2022-11-28 RX ORDER — CLOPIDOGREL BISULFATE 75 MG/1
TABLET ORAL
Qty: 90 TABLET | Refills: 0 | Status: SHIPPED | OUTPATIENT
Start: 2022-11-28

## 2022-11-28 RX ORDER — LISINOPRIL 2.5 MG/1
TABLET ORAL
Qty: 90 TABLET | Refills: 0 | Status: SHIPPED | OUTPATIENT
Start: 2022-11-28

## 2023-02-27 DIAGNOSIS — I25.5 CARDIOMYOPATHY, ISCHEMIC: ICD-10-CM

## 2023-02-27 DIAGNOSIS — I21.3 ST ELEVATION MYOCARDIAL INFARCTION (STEMI), UNSPECIFIED ARTERY (HCC): ICD-10-CM

## 2023-02-27 DIAGNOSIS — I25.10 CORONARY ARTERY DISEASE INVOLVING NATIVE CORONARY ARTERY OF NATIVE HEART WITHOUT ANGINA PECTORIS: ICD-10-CM

## 2023-02-27 RX ORDER — CLOPIDOGREL BISULFATE 75 MG/1
TABLET ORAL
Qty: 90 TABLET | Refills: 0 | Status: SHIPPED | OUTPATIENT
Start: 2023-02-27

## 2023-02-27 RX ORDER — LISINOPRIL 2.5 MG/1
TABLET ORAL
Qty: 90 TABLET | Refills: 0 | Status: SHIPPED | OUTPATIENT
Start: 2023-02-27

## 2023-05-27 DIAGNOSIS — I25.10 CORONARY ARTERY DISEASE INVOLVING NATIVE CORONARY ARTERY OF NATIVE HEART WITHOUT ANGINA PECTORIS: ICD-10-CM

## 2023-05-27 DIAGNOSIS — I21.3 ST ELEVATION MYOCARDIAL INFARCTION (STEMI), UNSPECIFIED ARTERY (HCC): ICD-10-CM

## 2023-05-27 DIAGNOSIS — I25.5 CARDIOMYOPATHY, ISCHEMIC: ICD-10-CM

## 2023-05-31 RX ORDER — CLOPIDOGREL BISULFATE 75 MG/1
TABLET ORAL
Qty: 90 TABLET | Refills: 0 | Status: SHIPPED | OUTPATIENT
Start: 2023-05-31

## 2023-05-31 RX ORDER — LISINOPRIL 2.5 MG/1
TABLET ORAL
Qty: 90 TABLET | Refills: 0 | Status: SHIPPED | OUTPATIENT
Start: 2023-05-31

## 2023-05-31 RX ORDER — METOPROLOL SUCCINATE 25 MG/1
TABLET, EXTENDED RELEASE ORAL
Qty: 45 TABLET | Refills: 0 | Status: SHIPPED | OUTPATIENT
Start: 2023-05-31

## 2023-05-31 NOTE — TELEPHONE ENCOUNTER
Larry Coil I believe the pt's medications are ready to be signed off. The Pt called back stating he has no more medications left and no refills. I informed pt that he has to have an appt scheduled in order to get refills.  Pt is now scheduled with abelino at [facility] for 6/9/23 at 330pm.

## 2023-06-08 PROBLEM — R09.89 CAROTID BRUIT: Status: ACTIVE | Noted: 2023-06-08

## 2023-06-08 NOTE — PROGRESS NOTES
[facility]   CARDIAC EVALUATION NOTE  [phone]      PCP:  Oziel Barry MD    Reason for Consultation/Chief Complaint: Follow up, STEMI, cardiomyopathy    Subjective   History of Present Illness:  Susie France is a 79 y.o. patient with a history of CAD with STEMI who presents for hospital follow up. His echo from 10/13/19 showed his EF was 40% with hypokinesis of the mid and basal inferoseptal and anteroseptal wall segments. Grade I DD. His cardiac cath from 10/13/19 resulted in a successful PCI to proximal RCA with GIUSEPPE and distal RCA with GIUSEPPE. OV 12/19/19 reported he had stopped smoking. He denied recurrent CP, palpitations, dizziness or syncope. He reported he had SOB with brilinta and late at night. He reported he lost his insurance after changing jobs. VV 5/1/20 he reported having occasional dizziness. Reported that he was taking 1/2 tablet of Brilinta twice daily, became sob when he was taking a full tablet. Reported one episode of chest discomfort that felt like gravel across his chest.    VV 12/17/20 he reported feeling well until a month prior. Reported having dizziness which caused him to fall. Reported that he had balance issues frequently. Reported CP and palpitations with the episodes. OV 6/8/2022 He reported doing okay. No chest pain or problems with breathing. He reported taking all medications as prescribed. He had concerns of circulation in neck and legs. Today he reports he is doing well overall, he is working 40 hours a week. Denies  chest pain, palpitations, dizziness, syncope and edema. Has occ sob. Past Medical History:   has a past medical history of Back pain and Heart attack ([address] Surgical History:   has a past surgical history that includes back surgery. Social History:   reports that he has been smoking cigarettes. He has been smoking an average of 1 pack per day.  He has never used smokeless tobacco. He reports that he

## 2023-06-09 ENCOUNTER — OFFICE VISIT (OUTPATIENT)
Dept: CARDIOLOGY CLINIC | Age: 68
End: 2023-06-09
Payer: COMMERCIAL

## 2023-06-09 VITALS
SYSTOLIC BLOOD PRESSURE: 120 MMHG | WEIGHT: 138 LBS | DIASTOLIC BLOOD PRESSURE: 80 MMHG | TEMPERATURE: 98.6 F | BODY MASS INDEX: 21.66 KG/M2 | HEART RATE: 72 BPM | OXYGEN SATURATION: 96 % | HEIGHT: 67 IN

## 2023-06-09 DIAGNOSIS — I25.10 CORONARY ARTERY DISEASE INVOLVING NATIVE CORONARY ARTERY OF NATIVE HEART WITHOUT ANGINA PECTORIS: Primary | ICD-10-CM

## 2023-06-09 DIAGNOSIS — R07.2 PRECORDIAL PAIN: ICD-10-CM

## 2023-06-09 DIAGNOSIS — R09.89 CAROTID BRUIT, UNSPECIFIED LATERALITY: ICD-10-CM

## 2023-06-09 DIAGNOSIS — I21.11 ST ELEVATION MYOCARDIAL INFARCTION INVOLVING RIGHT CORONARY ARTERY (HCC): ICD-10-CM

## 2023-06-09 PROCEDURE — 99214 OFFICE O/P EST MOD 30 MIN: CPT | Performed by: INTERNAL MEDICINE

## 2023-06-09 PROCEDURE — 1123F ACP DISCUSS/DSCN MKR DOCD: CPT | Performed by: INTERNAL MEDICINE

## 2023-06-09 PROCEDURE — 93000 ELECTROCARDIOGRAM COMPLETE: CPT | Performed by: INTERNAL MEDICINE

## 2023-06-09 RX ORDER — NITROGLYCERIN 0.4 MG/1
0.4 TABLET SUBLINGUAL EVERY 5 MIN PRN
Qty: 25 TABLET | Refills: 2 | Status: SHIPPED | OUTPATIENT
Start: 2023-06-09

## 2023-06-09 NOTE — PATIENT INSTRUCTIONS
1) Echo to check heart size and function  2) Lab orders: Lipid, LFT, TSH   3) Follow up in 1 year   Your provider has ordered testing for further evaluation. An order/prescription has been included in your paper work. To schedule outpatient testing, contact Central Scheduling by calling [address] [place], [place] [address]Y ([phone]).

## 2023-08-10 ENCOUNTER — HOSPITAL ENCOUNTER (OUTPATIENT)
Dept: CARDIOLOGY | Age: 68
Discharge: HOME | End: 2023-08-10
Payer: COMMERCIAL

## 2023-08-10 DIAGNOSIS — I21.3 ST ELEVATION MYOCARDIAL INFARCTION (STEMI), UNSPECIFIED ARTERY (HCC): ICD-10-CM

## 2023-08-10 DIAGNOSIS — I25.5 CARDIOMYOPATHY, ISCHEMIC: ICD-10-CM

## 2023-08-10 DIAGNOSIS — I25.10 CORONARY ARTERY DISEASE INVOLVING NATIVE CORONARY ARTERY OF NATIVE HEART WITHOUT ANGINA PECTORIS: ICD-10-CM

## 2023-08-10 DIAGNOSIS — I21.11 ST ELEVATION MYOCARDIAL INFARCTION INVOLVING RIGHT CORONARY ARTERY (HCC): ICD-10-CM

## 2023-08-10 LAB
LV EF: 35 %
LVEF MODALITY: NORMAL

## 2023-08-10 PROCEDURE — 93306 TTE W/DOPPLER COMPLETE: CPT

## 2023-08-14 ENCOUNTER — TELEPHONE (OUTPATIENT)
Dept: CARDIOLOGY CLINIC | Age: 68
End: 2023-08-14

## 2023-08-14 RX ORDER — CLOPIDOGREL BISULFATE 75 MG/1
TABLET ORAL
Qty: 90 TABLET | Refills: 3 | Status: SHIPPED | OUTPATIENT
Start: 2023-08-14

## 2023-08-14 RX ORDER — LISINOPRIL 2.5 MG/1
TABLET ORAL
Qty: 90 TABLET | Refills: 3 | Status: SHIPPED | OUTPATIENT
Start: 2023-08-14

## 2023-08-14 RX ORDER — ATORVASTATIN CALCIUM 40 MG/1
40 TABLET, FILM COATED ORAL NIGHTLY
Qty: 90 TABLET | Refills: 3 | Status: SHIPPED | OUTPATIENT
Start: 2023-08-14

## 2023-08-14 RX ORDER — METOPROLOL SUCCINATE 25 MG/1
TABLET, EXTENDED RELEASE ORAL
Qty: 45 TABLET | Refills: 3 | Status: SHIPPED | OUTPATIENT
Start: 2023-08-14

## 2023-08-16 ENCOUNTER — TELEPHONE (OUTPATIENT)
Dept: CARDIOLOGY CLINIC | Age: 68
End: 2023-08-16

## 2023-08-16 NOTE — TELEPHONE ENCOUNTER
Spoke with patient on the phone and relayed srj result and recommendations. Patient states he is going to think about the cath and call us back in a few weeks once he decides.  He does not want to repeat a stress due to previous incidents with the stress test.     [facility]

## 2023-08-16 NOTE — TELEPHONE ENCOUNTER
I would let him know it is possible it may improve, however a rt/lt ht cath is likely best to assess and to assess prognosis. Thank you.

## 2023-08-16 NOTE — TELEPHONE ENCOUNTER
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 He wants to know if he does not move forward with the angiogram; what are the chances that his heart function will increase without intervention. Please advise.

## 2023-08-16 NOTE — TELEPHONE ENCOUNTER
----- Message from Anil Eng MD sent at 8/14/2023  7:17 AM EDT -----  Let patient know echo test shows reduced heart function, rec: stress test, however if pt declines that, then options would include med tx vs cath. If pt wants to pursue cath, lets schedule that. If scheduled, lets schedule for rt/lt ht cath. Thanks.

## 2023-08-17 NOTE — TELEPHONE ENCOUNTER
Echo results reviewed. His ejection fraction appears unchanged in the moderate to severely reduced range at 35% compared to previous echocardiogram in 2019. While this could explain his shortness of breath, his IVC is not dilated. I would not start him on diuretics just yet but I will route this message to Dr. Taco Zhou to see what his thoughts are regarding breathing symptoms and further steps.

## 2023-08-17 NOTE — TELEPHONE ENCOUNTER
Patient returned call. He states he has had time to think and is agreeable to [facility] and [facility] per SRJ. Greta, can you please help facilitate this. Thank you.      Blaire Howell

## 2023-08-18 NOTE — TELEPHONE ENCOUNTER
Procedure:  C/C  Doctor:  Dr. German Pope  Date:  8/30/23  Time:  10am  Arrival:  8:30am  Reps:  n/a  Anesthesia:  n/a      Spoke with patient. Please have patient arrive to the main entrance of [facility] ([address] [place] [address], [address] [place] [address]) and check in with the registration desk. They will be directed to the Cath Lab. Please call patient regarding medication instructions. Remind patient to be NPO after midnight (8 hours prior). Do not apply lotions/creams on skin the day of procedure.

## 2023-08-30 ENCOUNTER — HOSPITAL ENCOUNTER (OUTPATIENT)
Dept: CARDIAC CATH/INVASIVE PROCEDURES | Age: 68
Discharge: HOME | End: 2023-08-30
Attending: INTERNAL MEDICINE | Admitting: INTERNAL MEDICINE
Payer: COMMERCIAL

## 2023-08-30 VITALS
BODY MASS INDEX: 21.19 KG/M2 | WEIGHT: 135 LBS | OXYGEN SATURATION: 94 % | HEIGHT: 67 IN | DIASTOLIC BLOOD PRESSURE: 70 MMHG | RESPIRATION RATE: 18 BRPM | HEART RATE: 63 BPM | SYSTOLIC BLOOD PRESSURE: 97 MMHG

## 2023-08-30 LAB
ANION GAP SERPL CALCULATED.3IONS-SCNC: 8 MMOL/L (ref 3–16)
BUN SERPL-MCNC: 11 MG/DL (ref 7–20)
CALCIUM SERPL-MCNC: 9.1 MG/DL (ref 8.3–10.6)
CHLORIDE SERPL-SCNC: 98 MMOL/L (ref 99–110)
CHOLEST SERPL-MCNC: 110 MG/DL (ref 0–199)
CO2 SERPL-SCNC: 26 MMOL/L (ref 21–32)
CREAT SERPL-MCNC: 0.7 MG/DL (ref 0.8–1.3)
DEPRECATED RDW RBC AUTO: 13.7 % (ref 12.4–15.4)
EKG ATRIAL RATE: 63 BPM
EKG DIAGNOSIS: NORMAL
EKG P AXIS: 72 DEGREES
EKG P-R INTERVAL: 182 MS
EKG Q-T INTERVAL: 410 MS
EKG QRS DURATION: 86 MS
EKG QTC CALCULATION (BAZETT): 419 MS
EKG R AXIS: 38 DEGREES
EKG T AXIS: 1 DEGREES
EKG VENTRICULAR RATE: 63 BPM
GFR SERPLBLD CREATININE-BSD FMLA CKD-EPI: >60 ML/MIN/{1.73_M2}
GLUCOSE SERPL-MCNC: 111 MG/DL (ref 70–99)
HCT VFR BLD AUTO: 46.2 % (ref 40.5–52.5)
HDLC SERPL-MCNC: 48 MG/DL (ref 40–60)
HGB BLD-MCNC: 15.6 G/DL (ref 13.5–17.5)
INR PPP: 1.02 (ref 0.84–1.16)
LDLC SERPL CALC-MCNC: 48 MG/DL
LV EF: 38 %
LVEF MODALITY: NORMAL
MCH RBC QN AUTO: 31.8 PG (ref 26–34)
MCHC RBC AUTO-ENTMCNC: 33.7 G/DL (ref 31–36)
MCV RBC AUTO: 94.3 FL (ref 80–100)
PLATELET # BLD AUTO: 185 K/UL (ref 135–450)
PMV BLD AUTO: 8.1 FL (ref 5–10.5)
POC ACT LR: 193 SEC
POC ACT LR: 290 SEC
POTASSIUM SERPL-SCNC: 4.1 MMOL/L (ref 3.5–5.1)
PROTHROMBIN TIME: 13.5 SEC (ref 11.5–14.8)
RBC # BLD AUTO: 4.89 M/UL (ref 4.2–5.9)
SODIUM SERPL-SCNC: 132 MMOL/L (ref 136–145)
TRIGL SERPL-MCNC: 69 MG/DL (ref 0–150)
VLDLC SERPL CALC-MCNC: 14 MG/DL
WBC # BLD AUTO: 9.2 K/UL (ref 4–11)

## 2023-08-30 PROCEDURE — 85027 COMPLETE CBC AUTOMATED: CPT

## 2023-08-30 PROCEDURE — C1887 CATHETER, GUIDING: HCPCS | Performed by: INTERNAL MEDICINE

## 2023-08-30 PROCEDURE — 2709999900 HC NON-CHARGEABLE SUPPLY: Performed by: INTERNAL MEDICINE

## 2023-08-30 PROCEDURE — 2500000003 HC RX 250 WO HCPCS

## 2023-08-30 PROCEDURE — 93460 R&L HRT ART/VENTRICLE ANGIO: CPT | Performed by: INTERNAL MEDICINE

## 2023-08-30 PROCEDURE — 6360000002 HC RX W HCPCS

## 2023-08-30 PROCEDURE — 80048 BASIC METABOLIC PNL TOTAL CA: CPT

## 2023-08-30 PROCEDURE — 85347 COAGULATION TIME ACTIVATED: CPT

## 2023-08-30 PROCEDURE — 93460 R&L HRT ART/VENTRICLE ANGIO: CPT

## 2023-08-30 PROCEDURE — 85610 PROTHROMBIN TIME: CPT

## 2023-08-30 PROCEDURE — 93010 ELECTROCARDIOGRAM REPORT: CPT | Performed by: INTERNAL MEDICINE

## 2023-08-30 PROCEDURE — 99152 MOD SED SAME PHYS/QHP 5/>YRS: CPT | Performed by: INTERNAL MEDICINE

## 2023-08-30 PROCEDURE — C1769 GUIDE WIRE: HCPCS | Performed by: INTERNAL MEDICINE

## 2023-08-30 PROCEDURE — C1894 INTRO/SHEATH, NON-LASER: HCPCS | Performed by: INTERNAL MEDICINE

## 2023-08-30 PROCEDURE — 93458 L HRT ARTERY/VENTRICLE ANGIO: CPT

## 2023-08-30 PROCEDURE — 80061 LIPID PANEL: CPT

## 2023-08-30 PROCEDURE — 93005 ELECTROCARDIOGRAM TRACING: CPT | Performed by: INTERNAL MEDICINE

## 2023-08-30 RX ORDER — SODIUM CHLORIDE 9 MG/ML
INJECTION, SOLUTION INTRAVENOUS PRN
OUTPATIENT
Start: 2023-08-30

## 2023-08-30 RX ORDER — LORAZEPAM 0.5 MG/1
0.5 TABLET ORAL
Status: DISCONTINUED | OUTPATIENT
Start: 2023-08-30 | End: 2023-08-30 | Stop reason: HOSPADM

## 2023-08-30 RX ORDER — FENTANYL CITRATE 50 UG/ML
INJECTION, SOLUTION INTRAMUSCULAR; INTRAVENOUS
Status: COMPLETED | OUTPATIENT
Start: 2023-08-30 | End: 2023-08-30

## 2023-08-30 RX ORDER — SODIUM CHLORIDE 0.9 % (FLUSH) 0.9 %
5-40 SYRINGE (ML) INJECTION PRN
OUTPATIENT
Start: 2023-08-30

## 2023-08-30 RX ORDER — MIDAZOLAM HYDROCHLORIDE 1 MG/ML
INJECTION INTRAMUSCULAR; INTRAVENOUS
Status: COMPLETED | OUTPATIENT
Start: 2023-08-30 | End: 2023-08-30

## 2023-08-30 RX ORDER — SODIUM CHLORIDE 9 MG/ML
INJECTION, SOLUTION INTRAVENOUS PRN
Status: DISCONTINUED | OUTPATIENT
Start: 2023-08-30 | End: 2023-08-30 | Stop reason: HOSPADM

## 2023-08-30 RX ORDER — HEPARIN SODIUM 1000 [USP'U]/ML
INJECTION, SOLUTION INTRAVENOUS; SUBCUTANEOUS
Status: COMPLETED | OUTPATIENT
Start: 2023-08-30 | End: 2023-08-30

## 2023-08-30 RX ORDER — SODIUM CHLORIDE 0.9 % (FLUSH) 0.9 %
5-40 SYRINGE (ML) INJECTION PRN
Status: DISCONTINUED | OUTPATIENT
Start: 2023-08-30 | End: 2023-08-30 | Stop reason: HOSPADM

## 2023-08-30 RX ORDER — SODIUM CHLORIDE 0.9 % (FLUSH) 0.9 %
5-40 SYRINGE (ML) INJECTION EVERY 12 HOURS SCHEDULED
Status: DISCONTINUED | OUTPATIENT
Start: 2023-08-30 | End: 2023-08-30 | Stop reason: HOSPADM

## 2023-08-30 RX ORDER — ASPIRIN 81 MG/1
244 TABLET, CHEWABLE ORAL ONCE
Status: DISCONTINUED | OUTPATIENT
Start: 2023-08-30 | End: 2023-08-30

## 2023-08-30 RX ORDER — ONDANSETRON 2 MG/ML
4 INJECTION INTRAMUSCULAR; INTRAVENOUS EVERY 6 HOURS PRN
Status: DISCONTINUED | OUTPATIENT
Start: 2023-08-30 | End: 2023-08-30 | Stop reason: HOSPADM

## 2023-08-30 RX ORDER — SODIUM CHLORIDE 0.9 % (FLUSH) 0.9 %
5-40 SYRINGE (ML) INJECTION EVERY 12 HOURS SCHEDULED
OUTPATIENT
Start: 2023-08-30

## 2023-08-30 RX ADMIN — HEPARIN SODIUM 5000 UNITS: 1000 INJECTION, SOLUTION INTRAVENOUS; SUBCUTANEOUS at 12:11

## 2023-08-30 RX ADMIN — FENTANYL CITRATE 25 MCG: 50 INJECTION, SOLUTION INTRAMUSCULAR; INTRAVENOUS at 12:02

## 2023-08-30 RX ADMIN — MIDAZOLAM HYDROCHLORIDE 1 MG: 1 INJECTION INTRAMUSCULAR; INTRAVENOUS at 12:01

## 2023-08-30 NOTE — DISCHARGE INSTRUCTIONS
Cath Labs at  [facility]   Discharge Instructions        8/30/2023  Dayan Devine   Date of Birth 1955       Activity:  No driving for 24 hours. In 24 hours you may remove dressing and shower, wash site gently with soap and water and leave open to air  Avoid submerging your arm in sitting water for 5 days. Do not use your right hand for 24 hours, then  No lifting more than 5 pounds for 5 days. No lotions, powders, or ointments near site for 5 days. No work/school for 5 days unless instructed otherwise by your cardiologist.    Diet:   Resume previous diet, if a cardiac diet is specified you will receive a handout with  general guidelines. Drink extra non-alcoholic/decaffienated fluids for first 24 hours after your procedure. Arm Management:  If bleeding occurs from the site or a hematoma (lump) begins to increase in size, apply pressure directly over the site, call 911 to return to the hospital.    Special Instructions:  Report any coolness or numbness in the arm  Report any chills, fever, itching, red bumps or rash   Report any of the following to the MD: drainage from the site, redness and/or swelling at the site, increased tenderness at the site   If you are currently taking Metformin or Metformin combination medications for Diabetes, hold your dose for 48 hours after your procedure. Consult your Cardiologist before taking any NSAIDS, vitamin supplements, estrogen, or estrogen plus progestin. Do not stop taking Plavix, Brilinta or Effient, without first consulting your cardiologist.    Sedation Discharge Instructions: For the next 24 hours do not drive a car, operate machinery, power tools or kitchen appliances. Do not drink alcohol; including beer or wine. Do not make any important decisions or sign any important papers. For the next 24 hours you can expect drowsiness, light-headed or dizziness, nausea/ vomiting, inability to concentrate, fatigue and desire to sleep.   We strongly

## 2023-08-30 NOTE — PROGRESS NOTES
Received pt from cath lab, pt has brachial sheath site in right brachial and tr band on right wrist. Pt is alert and speaking clearly, sites are unremarkable, vital signs stable. Discharge instructions reviewed with pt and spouse, pt taken to vehicle per wheel chair.

## 2023-08-30 NOTE — PROCEDURES
CARDIAC CATHETERIZATION REPORT     Procedure Date:  2023  Patient Name: Oli Singh  MRN: [id number] : 1955      INDICATION     Ischemic cardiomyopathy  Fatigue    PROCEDURES PERFORMED     Right heart catheterization  Left heart catheterization  LVgram  Coronary angiogam  Coronary cath  Monitoring of moderate conscious sedation        PROCEDURE DESCRIPTION   Risks/benefits/alternatives/outcomes were discussed with patient and/or family and informed consent was obtained. Using the [employer] scale, the patient's right radial artery was found to be a level B. Patient was prepped draped in the usual sterile fashion. Local anaesthetic was applied over puncture sites. Prior to the procedure, right peripheral antecubital IV was placed and this was exchanged out using micropuncture kit for a 5 [place] sheath. 5 [place] PA catheter was used for right heart catheterization and this was then removed. Sheath was removed at the end of the procedure and manual pressure was applied until hemostasis was obtained. Using a back wall technique, a 6 Japanese Terumo sheath was inserted into right radial artery. Verapamil, nitroglycerin, Cardene were administered through the sheath. Heparin was administered. Diagnostic 5 Spanish pigtail, ultra catheters were used for diagnostic angiograms. At the conclusion of the procedure, a TR band was placed over the puncture site and hemostasis was obtained. There were no immediate complications. I supervised sedation from 12:01 PM to 12:28 PM with versed 1 mg/fentanyl 25 mcg during the procedure. An independent trained observer pushed meds at my direction. We monitored the patient's level of consciousness and vital signs/physiologic status throughout the procedure duration (see times listed previously). 115 cc contrast was utilized. <20cc EBL.       FINDINGS     HEMODYNAMICS    CHAMBER PRESSURE SATURATION   RA 1 69%   RV 15/1    PA 18/5 69%   PW 6    AORTA 94/68 93%

## 2023-09-01 LAB — POC ACT LR: 317 SEC

## 2023-09-27 ENCOUNTER — OFFICE VISIT (OUTPATIENT)
Dept: CARDIOLOGY CLINIC | Age: 68
End: 2023-09-27

## 2023-09-27 VITALS
DIASTOLIC BLOOD PRESSURE: 66 MMHG | BODY MASS INDEX: 21.74 KG/M2 | OXYGEN SATURATION: 97 % | SYSTOLIC BLOOD PRESSURE: 104 MMHG | HEIGHT: 67 IN | HEART RATE: 77 BPM | WEIGHT: 138.5 LBS

## 2023-09-27 DIAGNOSIS — I50.20 HEART FAILURE WITH REDUCED EJECTION FRACTION (HCC): ICD-10-CM

## 2023-09-27 DIAGNOSIS — I25.5 CARDIOMYOPATHY, ISCHEMIC: Primary | ICD-10-CM

## 2023-09-27 DIAGNOSIS — I50.22 CHRONIC SYSTOLIC HEART FAILURE (HCC): ICD-10-CM

## 2023-09-27 NOTE — PROGRESS NOTES
[facility]  Cardiac Follow-up    Primary Care Doctor:  Tatum Neumann MD    Chief Complaint   Patient presents with    Follow-up    Cardiomyopathy        History of Present Illness:  I had the pleasure of seeing Jose Church as a new patient to me for cardiomyopathy, ishcemic. Hx of STEMI. cath from 10/13/19 resulted in a successful PCI to proximal RCA with GIUSEPPE and distal RCA with GIUSEPPE. He had [facility] 8/30/2023- moderate CAD   He was having issues severe fatigue prior to cardiac cath . Improved since heart cath, feels like he is doing better. Appetite is better  No shortness of breath   Tolerating medications. No dizziness or lightheadedness     Lucio Austin describes symptoms including fatigue but denies chest pain, dyspnea, syncope. Past Medical History:   has a past medical history of Back pain and Heart attack ([address]). Surgical History:   has a past surgical history that includes back surgery. Social History:   reports that he has been smoking cigarettes. He has been smoking an average of 1 pack per day. He has never used smokeless tobacco. He reports that he does not drink alcohol and does not use drugs. Family History:   Family History   Problem Relation Age of Onset    COPD Mother     Cancer Father      Home Medications:  Prior to Admission medications    Medication Sig Start Date End Date Taking?  Authorizing Provider   lisinopril (PRINIVIL;ZESTRIL) 2.5 MG tablet Take 1 tablet by mouth once daily 8/14/23  Yes Karissa Moncada MD   clopidogrel (PLAVIX) 75 MG tablet Take 1 tablet by mouth once daily 8/14/23  Yes Karissa Moncada MD   metoprolol succinate (TOPROL XL) 25 MG extended release tablet Take 1/2 (one-half) tablet by mouth once daily 8/14/23  Yes Karissa Moncada MD   atorvastatin (LIPITOR) 40 MG tablet Take 1 tablet by mouth nightly 8/14/23  Yes Karissa Moncada MD   nitroGLYCERIN (NITROSTAT) 0.4 MG SL tablet Place 1 tablet under the tongue every 5 minutes as needed for Chest pain up to max of

## 2023-09-27 NOTE — PATIENT INSTRUCTIONS
Plan:   Continue to stay active  Continue toprol 12.5mg daily   Continue lisinopril 2.5mg daily   Follow up 6 months or sooner if needed

## 2023-10-07 ENCOUNTER — HOSPITAL ENCOUNTER (EMERGENCY)
Age: 68
Discharge: HOME | End: 2023-10-07
Payer: COMMERCIAL

## 2023-10-07 VITALS
HEIGHT: 67 IN | TEMPERATURE: 98.7 F | WEIGHT: 139 LBS | HEART RATE: 79 BPM | BODY MASS INDEX: 21.82 KG/M2 | RESPIRATION RATE: 16 BRPM | DIASTOLIC BLOOD PRESSURE: 71 MMHG | SYSTOLIC BLOOD PRESSURE: 120 MMHG | OXYGEN SATURATION: 95 %

## 2023-10-07 DIAGNOSIS — U07.1 COVID: Primary | ICD-10-CM

## 2023-10-07 PROCEDURE — 99282 EMERGENCY DEPT VISIT SF MDM: CPT

## 2023-10-07 ASSESSMENT — LIFESTYLE VARIABLES
HOW MANY STANDARD DRINKS CONTAINING ALCOHOL DO YOU HAVE ON A TYPICAL DAY: PATIENT DOES NOT DRINK
HOW OFTEN DO YOU HAVE A DRINK CONTAINING ALCOHOL: NEVER

## 2023-10-08 NOTE — ED PROVIDER NOTES
[address] [place] [address]  ED  EMERGENCY DEPARTMENT ENCOUNTER        Pt Name: Kirsten Quijano  MRN: [id number]  [address] 1955  Date of evaluation: 10/7/2023  Provider: CHECO Malloy  PCP: Andrea Washington MD  Note Started: 8:22 PM EDT 10/7/23      GRECIA. I have evaluated this patient. CHIEF COMPLAINT       Chief Complaint   Patient presents with    Positive For Covid-19     Patient states he tested positive for covid and he wants \"those pills so I don't end up in the hospital\"       HISTORY OF PRESENT ILLNESS: 1 or more Elements     History from : Patient    Limitations to history : None    Kirsten Quijano is a 79 y.o. male who presents to the emergency department today requesting \"pills\" for COVID. Patient states he tested positive for COVID yesterday. Began with a fever and chills yesterday. Also complaining of diarrhea. Denies any chest pain, shortness of breath or cough. He states that his brother in law and parents were previously prescribed pills for their covid case and he would like these pills so he doesn't \"end up in the hospital.\"  Patient does have cardiac history and states he is on Plavix. He states his fever has been low-grade. Nursing Notes were all reviewed and agreed with or any disagreements were addressed in the HPI. REVIEW OF SYSTEMS :      Review of Systems    Positives and Pertinent negatives as per HPI.      SURGICAL HISTORY     Past Surgical History:   Procedure Laterality Date    [facility]       Discharge Medication List as of 10/7/2023  8:24 PM        CONTINUE these medications which have NOT CHANGED    Details   lisinopril (PRINIVIL;ZESTRIL) 2.5 MG tablet Take 1 tablet by mouth once daily, Disp-90 tablet, R-3Normal      clopidogrel (PLAVIX) 75 MG tablet Take 1 tablet by mouth once daily, Disp-90 tablet, R-3Normal      metoprolol succinate (TOPROL XL) 25 MG extended release tablet Take 1/2 (one-half) tablet by mouth once
Moderna dose 1 and 2

## 2023-10-08 NOTE — ED NOTES
Written and verbal discharge provided to patient, patient verbalizes understanding. Patient ambulatory with steady gait, GCS 15, no acute signs or symptoms of distress. Patient discharged at this time.      Nehemiah Nissen, RN  10/07/23 2030

## 2024-03-29 ENCOUNTER — HOSPITAL ENCOUNTER (EMERGENCY)
Age: 69
Discharge: HOME | End: 2024-03-29
Payer: COMMERCIAL

## 2024-03-29 ENCOUNTER — APPOINTMENT (OUTPATIENT)
Dept: GENERAL RADIOLOGY | Age: 69
End: 2024-03-29
Payer: COMMERCIAL

## 2024-03-29 VITALS
HEART RATE: 84 BPM | TEMPERATURE: 98 F | OXYGEN SATURATION: 98 % | RESPIRATION RATE: 16 BRPM | DIASTOLIC BLOOD PRESSURE: 84 MMHG | SYSTOLIC BLOOD PRESSURE: 124 MMHG

## 2024-03-29 DIAGNOSIS — S93.402A SPRAIN OF LEFT ANKLE, UNSPECIFIED LIGAMENT, INITIAL ENCOUNTER: Primary | ICD-10-CM

## 2024-03-29 PROCEDURE — 99283 EMERGENCY DEPT VISIT LOW MDM: CPT

## 2024-03-29 PROCEDURE — 73610 X-RAY EXAM OF ANKLE: CPT

## 2024-03-29 ASSESSMENT — PAIN SCALES - GENERAL: PAINLEVEL_OUTOF10: 2

## 2024-03-29 ASSESSMENT — PAIN - FUNCTIONAL ASSESSMENT: PAIN_FUNCTIONAL_ASSESSMENT: 0-10

## 2024-03-29 NOTE — ED PROVIDER NOTES
[facility] Emergency Department    CHIEF COMPLAINT  Ankle Injury (Left ankle pain a week ago, stepped down on it wrong)      SHARED SERVICE VISIT  Evaluated by GRECIA.  My supervising physician was available for consultation.    HISTORY OF PRESENT ILLNESS  Lucio Austin is a 69 y.o. male who presents to the ED complaining of left ankle pain since Saturday.  He said he was walking down her steps when he suffered a left ankle inversion injury.  Noticed pain with some swelling.  Is able to tolerate weightbearing and has been working.  He came to the emergency department to ensure that he did fracture his ankle.  Rates his pain 2/10 and denies any radiation.  Is only taken Tylenol which does not help for his pain.  Denies any headache, body ache, fevers or chills.  Denies any coughing or sneezing.  Denies any sore throat or congestion.  Denies any vision changes or dizziness.  Denies any chest pain, shortness of breath, dyspnea on exertion.  Denies any nausea, vomiting, or abdominal pain.  Denies any urinary symptoms.  Denies any diarrhea or bloody stools.  Denies any new onset back pain.  Denies any recent travel or sick contacts.  No other complaints, modifying factors or associated symptoms.     Nursing notes reviewed.   Past Medical History:   Diagnosis Date    Back pain     CHF (congestive heart failure) (HCC)     Heart attack (HCC)      Past Surgical History:   Procedure Laterality Date    BACK SURGERY  1990     Family History   Problem Relation Age of Onset    COPD Mother     Cancer Father      Social History     Socioeconomic History    Marital status:      Spouse name: Not on file    Number of children: Not on file    Years of education: Not on file    Highest education level: Not on file   Occupational History    Not on file   Tobacco Use    Smoking status: Some Days     Current packs/day: 1.00     Types: Cigarettes    Smokeless tobacco: Never   Vaping Use    Vaping Use:

## 2024-03-29 NOTE — DISCHARGE INSTR - COC
Continuity of Care Form    Patient Name: Lucio Austin   :  1955  MRN:  [id number]    Admit date:  3/29/2024  Discharge date:  ***    Code Status Order: Prior   Advance Directives:     Admitting Physician:  No admitting provider for patient encounter.  PCP: Refugio Richards MD    Discharging Nurse: ***  Discharging Hospital Unit/Room#: T3/T3  Discharging Unit Phone Number: ***    Emergency Contact:   Extended Emergency Contact Information  Primary Emergency Contact: Mica Austin  Address: [address] [place] [address] [address]  Home Phone: [phone]  Relation: Spouse    Past Surgical History:  Past Surgical History:   Procedure Laterality Date    BACK SURGERY         Immunization History:   Immunization History   Administered Date(s) Administered    COVID-19, PFIZER PURPLE top, DILUTE for use, (age 12 y+), 30mcg/0.3mL 2021, 2021, 2021       Active Problems:  Patient Active Problem List   Diagnosis Code    Chest pain R07.9    Chronic back pain M54.9, G89.29    Current smoker F17.200    ST elevation myocardial infarction involving right coronary artery (HCC) I21.11    STEMI (ST elevation myocardial infarction) (HCC) I21.3    Cardiomyopathy, ischemic I25.5    SOB (shortness of breath) R06.02    Coronary artery disease involving native coronary artery of native heart without angina pectoris I25.10    Carotid bruit R09.89       Isolation/Infection:   Isolation            No Isolation          Patient Infection Status       None to display            Nurse Assessment:  Last Vital Signs: /84   Pulse 84   Temp 98 °F (36.7 °C) (Oral)   Resp 16   SpO2 98%     Last documented pain score (0-10 scale): Pain Level: 2  Last Weight:   Wt Readings from Last 1 Encounters:   10/07/23 63 kg (139 lb)     Mental Status:  {IP PT MENTAL STATUS:}    IV Access:  { JOSÉ IV ACCESS:[id number]}    Nursing Mobility/ADLs:  Walking   {[facility] DME

## 2024-08-08 DIAGNOSIS — I25.10 CORONARY ARTERY DISEASE INVOLVING NATIVE CORONARY ARTERY OF NATIVE HEART WITHOUT ANGINA PECTORIS: ICD-10-CM

## 2024-08-08 DIAGNOSIS — I21.3 ST ELEVATION MYOCARDIAL INFARCTION (STEMI), UNSPECIFIED ARTERY (HCC): ICD-10-CM

## 2024-08-08 DIAGNOSIS — I25.5 CARDIOMYOPATHY, ISCHEMIC: ICD-10-CM

## 2024-08-09 RX ORDER — CLOPIDOGREL BISULFATE 75 MG/1
TABLET ORAL
Qty: 90 TABLET | Refills: 0 | Status: SHIPPED | OUTPATIENT
Start: 2024-08-09

## 2024-08-09 RX ORDER — LISINOPRIL 2.5 MG/1
TABLET ORAL
Qty: 90 TABLET | Refills: 0 | Status: SHIPPED | OUTPATIENT
Start: 2024-08-09

## 2024-08-09 RX ORDER — ATORVASTATIN CALCIUM 40 MG/1
40 TABLET, FILM COATED ORAL NIGHTLY
Qty: 90 TABLET | Refills: 0 | Status: SHIPPED | OUTPATIENT
Start: 2024-08-09

## 2024-08-09 RX ORDER — METOPROLOL SUCCINATE 25 MG/1
TABLET, EXTENDED RELEASE ORAL
Qty: 45 TABLET | Refills: 0 | Status: SHIPPED | OUTPATIENT
Start: 2024-08-09

## 2024-09-17 ENCOUNTER — OFFICE VISIT (OUTPATIENT)
Dept: CARDIOLOGY CLINIC | Age: 69
End: 2024-09-17
Payer: MEDICARE

## 2024-09-17 VITALS
WEIGHT: 137.5 LBS | SYSTOLIC BLOOD PRESSURE: 146 MMHG | HEIGHT: 67 IN | BODY MASS INDEX: 21.58 KG/M2 | DIASTOLIC BLOOD PRESSURE: 72 MMHG | HEART RATE: 68 BPM | OXYGEN SATURATION: 98 %

## 2024-09-17 DIAGNOSIS — Z79.899 MEDICATION MANAGEMENT: ICD-10-CM

## 2024-09-17 DIAGNOSIS — R06.09 OTHER FORMS OF DYSPNEA: ICD-10-CM

## 2024-09-17 DIAGNOSIS — R07.2 PRECORDIAL PAIN: ICD-10-CM

## 2024-09-17 DIAGNOSIS — R09.89 CAROTID BRUIT, UNSPECIFIED LATERALITY: ICD-10-CM

## 2024-09-17 DIAGNOSIS — I21.11 ST ELEVATION MYOCARDIAL INFARCTION INVOLVING RIGHT CORONARY ARTERY (HCC): ICD-10-CM

## 2024-09-17 DIAGNOSIS — R06.02 SOB (SHORTNESS OF BREATH): ICD-10-CM

## 2024-09-17 DIAGNOSIS — I25.10 CORONARY ARTERY DISEASE INVOLVING NATIVE CORONARY ARTERY OF NATIVE HEART WITHOUT ANGINA PECTORIS: Primary | ICD-10-CM

## 2024-09-17 PROCEDURE — 99214 OFFICE O/P EST MOD 30 MIN: CPT | Performed by: INTERNAL MEDICINE

## 2024-09-17 PROCEDURE — G8420 CALC BMI NORM PARAMETERS: HCPCS | Performed by: INTERNAL MEDICINE

## 2024-09-17 PROCEDURE — 1123F ACP DISCUSS/DSCN MKR DOCD: CPT | Performed by: INTERNAL MEDICINE

## 2024-09-17 PROCEDURE — G8427 DOCREV CUR MEDS BY ELIG CLIN: HCPCS | Performed by: INTERNAL MEDICINE

## 2024-09-17 PROCEDURE — 1036F TOBACCO NON-USER: CPT | Performed by: INTERNAL MEDICINE

## 2024-09-17 PROCEDURE — 3017F COLORECTAL CA SCREEN DOC REV: CPT | Performed by: INTERNAL MEDICINE

## 2024-09-17 PROCEDURE — 93000 ELECTROCARDIOGRAM COMPLETE: CPT | Performed by: INTERNAL MEDICINE

## 2024-11-06 ENCOUNTER — TELEPHONE (OUTPATIENT)
Dept: CARDIOLOGY CLINIC | Age: 69
End: 2024-11-06

## 2024-11-06 DIAGNOSIS — I25.10 CORONARY ARTERY DISEASE INVOLVING NATIVE CORONARY ARTERY OF NATIVE HEART WITHOUT ANGINA PECTORIS: ICD-10-CM

## 2024-11-06 DIAGNOSIS — I21.3 ST ELEVATION MYOCARDIAL INFARCTION (STEMI), UNSPECIFIED ARTERY (HCC): ICD-10-CM

## 2024-11-06 NOTE — TELEPHONE ENCOUNTER
Patient called to cancel Echo scheduled for 11/7 at [facility]. Pt did not want to reschedule at this time. Let patient know to call CS back when he feels better to reschedule.

## 2024-11-07 RX ORDER — CLOPIDOGREL BISULFATE 75 MG/1
TABLET ORAL
Qty: 90 TABLET | Refills: 2 | Status: SHIPPED | OUTPATIENT
Start: 2024-11-07

## 2024-11-07 RX ORDER — ATORVASTATIN CALCIUM 40 MG/1
40 TABLET, FILM COATED ORAL NIGHTLY
Qty: 90 TABLET | Refills: 2 | Status: SHIPPED | OUTPATIENT
Start: 2024-11-07

## 2024-11-07 RX ORDER — METOPROLOL SUCCINATE 25 MG/1
TABLET, EXTENDED RELEASE ORAL
Qty: 45 TABLET | Refills: 2 | Status: SHIPPED | OUTPATIENT
Start: 2024-11-07

## 2024-11-07 NOTE — TELEPHONE ENCOUNTER
Last Office Visit: 9/17/2024 Provider: PRATEEK  Is provider OOT? No    Next Office Visit: none scheduled  **If no OV, when does pt need to be seen? in 1 month(s) with NP  **Has patient already had 30 day supply? No    LAST LABS:   CBC:   Lab Results   Component Value Date    WBC 9.2 08/30/2023    HGB 15.6 08/30/2023    HCT 46.2 08/30/2023    MCV 94.3 08/30/2023     08/30/2023     BMP:      Component      Latest Ref Rng 7/28/2021 8/30/2023   Sodium      136 - 145 mmol/L 137  132 (L)    Potassium      3.5 - 5.1 mmol/L 4.2  4.1    Chloride      99 - 110 mmol/L 102  98 (L)    CARBON DIOXIDE      21 - 32 mmol/L 22  26    Anion Gap      3 - 16  13  8    Glucose      70 - 99 mg/dL 106 (H)  111 (H)    BUN,BUNPL      7 - 20 mg/dL 11  11    Creatinine      0.8 - 1.3 mg/dL 0.8  0.7 (L)    Calcium      8.3 - 10.6 mg/dL 9.5  9.1    Est, Glom Filt Rate      >60   >60       Legend:  (H) High  (L) Low     LIPID:    Component      Latest Ref Rng 7/18/2022 8/30/2023   Cholesterol, Fasting      0 - 199 mg/dL 115     Triglyceride, Fasting      0 - 150 mg/dL 82     HDL Cholesterol      40 - 60 mg/dL 52  48    LDL Cholesterol      <100 mg/dL 47  48    VLDL      Not Established mg/dL 16  14    Cholesterol, Total      0 - 199 mg/dL  110    Triglycerides      0 - 150 mg/dL  69        **Check Care Everywhere? N/A  **Lab orders needed? N/A    Is encounter provider correct?   Yes  Does refill dosage match last filled?   Yes  Changes to script from Tele Encounters or LOV Plan?   no  Did you adjust dispensed amount or refills to reflect last and upcoming OV?  Yes    Requested Prescriptions     Pending Prescriptions Disp Refills    clopidogrel (PLAVIX) 75 MG tablet [Pharmacy Med Name: Clopidogrel Bisulfate 75 MG Oral Tablet] 90 tablet 0     Sig: Take 1 tablet by mouth once daily    atorvastatin (LIPITOR) 40 MG tablet [Pharmacy Med Name: Atorvastatin Calcium 40 MG Oral Tablet] 90 tablet 0     Sig: Take 1 tablet by mouth nightly    metoprolol

## 2024-11-10 DIAGNOSIS — I21.3 ST ELEVATION MYOCARDIAL INFARCTION (STEMI), UNSPECIFIED ARTERY (HCC): ICD-10-CM

## 2024-11-10 DIAGNOSIS — I25.5 CARDIOMYOPATHY, ISCHEMIC: ICD-10-CM

## 2024-11-10 DIAGNOSIS — I25.10 CORONARY ARTERY DISEASE INVOLVING NATIVE CORONARY ARTERY OF NATIVE HEART WITHOUT ANGINA PECTORIS: ICD-10-CM

## 2024-11-11 RX ORDER — LISINOPRIL 2.5 MG/1
TABLET ORAL
Qty: 90 TABLET | Refills: 2 | Status: SHIPPED | OUTPATIENT
Start: 2024-11-11

## 2024-11-11 NOTE — TELEPHONE ENCOUNTER
Last Office Visit: 9/17/2024 Provider: PRATEEK  Is provider OOT? No    **If no OV, when does pt need to be seen? Pt needs to be seen this month per last ov notes Follow up with NP Yudi Russell in 3 months or sooner if needed.   **Has patient already had 30 day supply? No    LAST LABS:   CBC:   Lab Results   Component Value Date    WBC 9.2 08/30/2023    HGB 15.6 08/30/2023    HCT 46.2 08/30/2023    MCV 94.3 08/30/2023     08/30/2023       Requested Prescriptions     Pending Prescriptions Disp Refills    lisinopril (PRINIVIL;ZESTRIL) 2.5 MG tablet [Pharmacy Med Name: Lisinopril 2.5 MG Oral Tablet] 90 tablet 0     Sig: Take 1 tablet by mouth once daily      Front- pt needs appt with [facility] soon if possible please, thank you

## 2024-11-15 NOTE — TELEPHONE ENCOUNTER
11/15- called pt @[phone] pt stated now is not a good time since he is working. Pt stated he will try to call sometime today to schedule appt.

## 2025-01-21 NOTE — PROGRESS NOTES
GRADIENT ACROSS AORTIC VALVE None   LV FUNCTION EF 35-40%   WALL MOTION Difficult to fully evaluate due to incomplete opacification   MITRAL REGURGITATION Mild     CORONARY ARTERIES     LM Calcified, less than 10% proximal/mid-distal stenosis, short vessel         LAD Heavily calcified, Bahwnagq-qrz-hjvvdr eccentric 50 to 70% stenoses.         LCX Large vessel proximally, 10 to 20% stenosis followed by mid-distal 60 to 70% eccentric stenosis that extends into medium to large size OM1.         RCA Mid, heavily calcified, proximal 10 to 20% stenosis, there is mid eccentric 50 to 60% stenosis within the stented segment.  Distal 20 to 30% stenosis.     PDA has proximal 20 to 30% stenosis and mid-distal 60% stenosis, vessel is small in that territory.  PLVIs a small to medium size vessel with proximal-mid eccentric 60 to 70% stenosis       CONCLUSIONS:      Low normal filling pressures  Patent RCA stent  Moderate CAD/ASHD and major epicardial vessels, it is eccentric and borderline moderate to severe  Would treat medically  May benefit from additional ischemia evaluation such as Lexiscan study, MUGA scan, and/or cardiac MRI  We will also refer to heart failure service to consider medication and device therapy such as Entresto and/or SGLT2 inhibitors.     Echo 8/10/2023   Summary   The left ventricular systolic function is moderately reduced with an   ejection fraction of 35 %.   There is moderate global hypokinesis with regional variation.   Normal left ventricular diastolic filling pressure.   Trace mitral and tricuspid regurgitation.   Trace pericardial effusion noted.      Core measures for HFrEF:  EF: 35-40%   ICD: NA  ACEi/ARB/ARNI: Lisinopril  BB: Metoprolol succinate  Fuad:  unlikely to tolerate given lower filling pressures on [facility]     SGLT2: unlikely to tolerate   Hydralazine/nitrates:    NYHA:                        Class I:  No limitation of physical activity.  ACC/ AHA Stage:      Stage C:  Structural heart

## 2025-01-22 ENCOUNTER — OFFICE VISIT (OUTPATIENT)
Dept: CARDIOLOGY CLINIC | Age: 70
End: 2025-01-22
Payer: MEDICARE

## 2025-01-22 VITALS
HEART RATE: 74 BPM | HEIGHT: 67 IN | WEIGHT: 146 LBS | SYSTOLIC BLOOD PRESSURE: 120 MMHG | OXYGEN SATURATION: 96 % | DIASTOLIC BLOOD PRESSURE: 72 MMHG | BODY MASS INDEX: 22.91 KG/M2

## 2025-01-22 DIAGNOSIS — I50.20 HFREF (HEART FAILURE WITH REDUCED EJECTION FRACTION) (HCC): ICD-10-CM

## 2025-01-22 DIAGNOSIS — Z79.899 MEDICATION MANAGEMENT: ICD-10-CM

## 2025-01-22 DIAGNOSIS — I25.10 ASHD (ARTERIOSCLEROTIC HEART DISEASE): ICD-10-CM

## 2025-01-22 DIAGNOSIS — R06.09 OTHER FORMS OF DYSPNEA: ICD-10-CM

## 2025-01-22 DIAGNOSIS — I25.5 CARDIOMYOPATHY, ISCHEMIC: Primary | ICD-10-CM

## 2025-01-22 LAB
ALBUMIN SERPL-MCNC: 4.3 G/DL (ref 3.4–5)
ALP SERPL-CCNC: 93 U/L (ref 40–129)
ALT SERPL-CCNC: 32 U/L (ref 10–40)
ANION GAP SERPL CALCULATED.3IONS-SCNC: 11 MMOL/L (ref 3–16)
AST SERPL-CCNC: 27 U/L (ref 15–37)
BILIRUB DIRECT SERPL-MCNC: 0.3 MG/DL (ref 0–0.3)
BILIRUB INDIRECT SERPL-MCNC: 0.2 MG/DL (ref 0–1)
BILIRUB SERPL-MCNC: 0.5 MG/DL (ref 0–1)
BUN SERPL-MCNC: 12 MG/DL (ref 7–20)
CALCIUM SERPL-MCNC: 9.6 MG/DL (ref 8.3–10.6)
CHLORIDE SERPL-SCNC: 103 MMOL/L (ref 99–110)
CHOLEST SERPL-MCNC: 113 MG/DL (ref 0–199)
CO2 SERPL-SCNC: 25 MMOL/L (ref 21–32)
CREAT SERPL-MCNC: 0.8 MG/DL (ref 0.8–1.3)
GFR SERPLBLD CREATININE-BSD FMLA CKD-EPI: >90 ML/MIN/{1.73_M2}
GLUCOSE SERPL-MCNC: 102 MG/DL (ref 70–99)
HDLC SERPL-MCNC: 52 MG/DL (ref 40–60)
LDL CHOLESTEROL: 53 MG/DL
NT-PROBNP SERPL-MCNC: 181 PG/ML (ref 0–124)
POTASSIUM SERPL-SCNC: 4.3 MMOL/L (ref 3.5–5.1)
PROT SERPL-MCNC: 6.6 G/DL (ref 6.4–8.2)
SODIUM SERPL-SCNC: 139 MMOL/L (ref 136–145)
TRIGL SERPL-MCNC: 38 MG/DL (ref 0–150)
VLDLC SERPL CALC-MCNC: 8 MG/DL

## 2025-01-22 PROCEDURE — 3017F COLORECTAL CA SCREEN DOC REV: CPT | Performed by: NURSE PRACTITIONER

## 2025-01-22 PROCEDURE — G8420 CALC BMI NORM PARAMETERS: HCPCS | Performed by: NURSE PRACTITIONER

## 2025-01-22 PROCEDURE — 99214 OFFICE O/P EST MOD 30 MIN: CPT | Performed by: NURSE PRACTITIONER

## 2025-01-22 PROCEDURE — G8427 DOCREV CUR MEDS BY ELIG CLIN: HCPCS | Performed by: NURSE PRACTITIONER

## 2025-01-22 PROCEDURE — 1160F RVW MEDS BY RX/DR IN RCRD: CPT | Performed by: NURSE PRACTITIONER

## 2025-01-22 PROCEDURE — 1123F ACP DISCUSS/DSCN MKR DOCD: CPT | Performed by: NURSE PRACTITIONER

## 2025-01-22 PROCEDURE — 4004F PT TOBACCO SCREEN RCVD TLK: CPT | Performed by: NURSE PRACTITIONER

## 2025-01-22 PROCEDURE — 1159F MED LIST DOCD IN RCRD: CPT | Performed by: NURSE PRACTITIONER

## 2025-01-22 NOTE — PATIENT INSTRUCTIONS
Continue to stay active  Continue toprol 12.5mg daily   Continue lisinopril 2.5mg daily   Will call you with lab results once resulted   Repeat echocardiogram as ordered- call to schedule [phone]   Follow up 6 months or sooner if needed

## 2025-03-03 NOTE — TELEPHONE ENCOUNTER
6/8/2022 SRJ  No upcoming appt    Please contact pt for yearly appt/med refills appt. Please note the date he schedules and route back. normal

## 2025-08-17 DIAGNOSIS — I25.5 CARDIOMYOPATHY, ISCHEMIC: ICD-10-CM

## 2025-08-17 DIAGNOSIS — I21.3 ST ELEVATION MYOCARDIAL INFARCTION (STEMI), UNSPECIFIED ARTERY (HCC): ICD-10-CM

## 2025-08-17 DIAGNOSIS — I25.10 CORONARY ARTERY DISEASE INVOLVING NATIVE CORONARY ARTERY OF NATIVE HEART WITHOUT ANGINA PECTORIS: ICD-10-CM

## 2025-08-18 RX ORDER — CLOPIDOGREL BISULFATE 75 MG/1
75 TABLET ORAL DAILY
Qty: 90 TABLET | Refills: 0 | Status: SHIPPED | OUTPATIENT
Start: 2025-08-18

## 2025-08-18 RX ORDER — ATORVASTATIN CALCIUM 40 MG/1
40 TABLET, FILM COATED ORAL NIGHTLY
Qty: 90 TABLET | Refills: 0 | Status: SHIPPED | OUTPATIENT
Start: 2025-08-18

## 2025-08-18 RX ORDER — LISINOPRIL 2.5 MG/1
2.5 TABLET ORAL DAILY
Qty: 90 TABLET | Refills: 0 | Status: SHIPPED | OUTPATIENT
Start: 2025-08-18

## 2025-08-18 RX ORDER — METOPROLOL SUCCINATE 25 MG/1
TABLET, EXTENDED RELEASE ORAL
Qty: 45 TABLET | Refills: 0 | Status: SHIPPED | OUTPATIENT
Start: 2025-08-18